# Patient Record
Sex: FEMALE | Race: WHITE | Employment: OTHER | URBAN - METROPOLITAN AREA
[De-identification: names, ages, dates, MRNs, and addresses within clinical notes are randomized per-mention and may not be internally consistent; named-entity substitution may affect disease eponyms.]

---

## 2021-12-08 ENCOUNTER — HOSPITAL ENCOUNTER (OUTPATIENT)
Age: 71
Setting detail: SPECIMEN
Discharge: HOME | End: 2021-12-08

## 2021-12-09 ENCOUNTER — HOSPITAL ENCOUNTER (OUTPATIENT)
Dept: ULTRASOUND IMAGING | Age: 71
Discharge: HOME | End: 2021-12-09
Payer: MEDICARE

## 2021-12-09 ENCOUNTER — HOSPITAL ENCOUNTER (OUTPATIENT)
Dept: MAMMOGRAPHY | Age: 71
Discharge: HOME | End: 2021-12-09
Payer: MEDICARE

## 2021-12-09 DIAGNOSIS — R92.8 ABNORMAL FINDING ON MAMMOGRAPHY: ICD-10-CM

## 2021-12-09 DIAGNOSIS — N63.0 BREAST MASS: ICD-10-CM

## 2021-12-09 DIAGNOSIS — Z98.890 STATUS POST LEFT BREAST BIOPSY: ICD-10-CM

## 2021-12-09 PROCEDURE — 19285 PERQ DEV BREAST 1ST US IMAG: CPT

## 2021-12-09 PROCEDURE — 2709999900 US PLACE BREAST LOC DEVICE 1ST LESION LEFT

## 2021-12-09 PROCEDURE — G0279 TOMOSYNTHESIS, MAMMO: HCPCS

## 2021-12-09 PROCEDURE — 77065 DX MAMMO INCL CAD UNI: CPT

## 2021-12-09 NOTE — PROGRESS NOTES
arrival for surgery. NOTE: ALL Gastric, Bariatric and Bowel surgery patients MUST follow their surgeon's instructions. 10. No gum, candy, mints, or ice chips day of procedure. 11. Please refrain from drinking alcohol the day before or day of your procedure. 12. Please do not smoke the day of your procedure. 13. Dress in loose, comfortable clothing appropriate for redressing after your procedure. Do not wear jewelry (including body piercings), make-up, fingernail polish, lotion, powders or metal hairclips. 15. Contacts will need to be removed prior to surgery. You may want to bring your eye glasses to wear immediately before and after surgery. 14. Dentures will need to be removed before your procedure. 13. Bring cases for your glasses, contacts, dentures, or hearing aids to protect them while you are in surgery. 16. If you use a CPAP, please bring it with you on the day of your procedure. 17. Do not shave or wax for 72 hours prior to procedure near your operative site  18. FOR WOMAN OF CHILDBEARING AGE ONLY- please make sure we can collect a urine sample on arrival.     If you have further questions, you may contact your surgeon's office or us at [phone]     Left instructions on patient's voicemail.     Kyler Larson RN.12/9/2021 .1:09 PM

## 2021-12-10 NOTE — PROGRESS NOTES
Place patient label inside box (if no patient label, complete below)  Name:  :  MR#:   Danielle Colder / PROCEDURE  1. I (we), [facility] (Patient Name) authorize Toy Godoy MD (Provider / Alessandro Ibarra) and/or such assistants as may be selected by him/her, to perform the following operation/procedure(s): LEFT BREAST LUMPECTOMY, LEFT BREAST SENTINEL NODE BIOPSY (2:00)PM. PLACEMENT OF BIOZORB MARKER, ULTRASOUND LATERALITY, LEFT BREAST 2 RADIOFREQUENCY TAGS, 1:00 5CM FTN AND 2:00 7CM FTN, TARGET MASS X 2 (2021) 10:30AM        Note: If unable to obtain consent prior to an emergent procedure, document the emergent reason in the medical record. This procedure has been explained to my (our) satisfaction and included in the explanation was:  A) The intended benefit, nature, and extent of the procedure to be performed;  B) The significant risks involved and the probability of success;  C) Alternative procedures and methods of treatment;  D) The dangers and probable consequences of such alternatives (including no procedure or treatment); E) The expected consequences of the procedure on my future health;  F) Whether other qualified individuals would be performing important surgical tasks and/or whether  would be present to advise or support the procedure. I (we) understand that there are other risks of infection and other serious complications in the pre-operative/procedural and postoperative/procedural stages of my (our) care. I (we) have asked all of the questions which I (we) thought were important in deciding whether or not to undergo treatment or diagnosis. These questions have been answered to my (our) satisfaction. I (we) understand that no assurance can be given that the procedure will be a success, and no guarantee or warranty of success has been given to me (us).     2. It has been explained to me (us) that during the course of the operation/procedure, unforeseen conditions may be revealed that necessitate extension of the original procedure(s) or different procedure(s) than those set forth in Paragraph 1. I (we) authorize and request that the above-named physician, his/her assistants or his/her designees, perform procedures as necessary and desirable if deemed to be in my (our) best interest.     Revised 8/2/2021                                                                          Page 1 of 2                   3. I acknowledge that health care personnel may be observing this procedure for the purpose of medical education or other specified purposes as may be necessary as requested and/or approved by my (our) physician. 4. I (we) consent to the disposal by the hospital Pathologist of the removed tissue, parts or organs in accordance with hospital policy. 5. I do ____ do not ____ consent to the use of a local infiltration pain blocking agent that will be used by my provider/surgical provider to help alleviate pain during my procedure. 6. I do ____ do not ____ consent to an emergent blood transfusion in the case of a life-threatening situation that requires blood components to be administered. This consent is valid for 24 hours from the beginning of the procedure. 7. This patient does ____ or does not ____ currently have a DNR status/order. If DNR order is in place, obtain Addendum to the Surgical Consent for ALL Patients with a DNR Order to address zion-operative status for limited intervention or DNR suspension.      8. I have read and fully understand the above Consent for Operation/Procedure and that all blanks were completed before I signed the consent.   _____________________________       _____________________      ____/____am/pm  Signature of Patient or legal representative      Printed Name / Relationship            Date / Time   ____________________________       _____________________ ____/____am/pm  Witness to Signature                                    Printed Name                    Date / Time     If patient is unable to sign or is a minor, complete the following)  Patient is a minor, ____ years of age, or unable to sign because:   ______________________________________________________________________________________________    [employer] If a phone consent is obtained, consent will be documented by using two health care professionals, each affirming that the consenting party has no questions and gives consent for the procedure discussed with the physician/provider.   _____________________          ____________________       _____/_____am/pm   2nd witness to phone consent        Printed name           Date / Time    Informed Consent:  I have provided the explanation described above in section 1 to the patient and/or legal representative.  I have provided the patient and/or legal representative with an opportunity to ask any questions about the proposed operation/procedure.   ___________________________          ____________________         ____/____am/pm  Provider / Proceduralist                            Printed name            Date / Time  Revised 8/2/2021                                                                      Page 2 of 2

## 2021-12-11 PROBLEM — Z17.0 MALIGNANT NEOPLASM OF UPPER-OUTER QUADRANT OF LEFT BREAST IN FEMALE, ESTROGEN RECEPTOR POSITIVE (HCC): Status: ACTIVE | Noted: 2021-12-11

## 2021-12-11 PROBLEM — C50.412 MALIGNANT NEOPLASM OF UPPER-OUTER QUADRANT OF LEFT BREAST IN FEMALE, ESTROGEN RECEPTOR POSITIVE (HCC): Status: ACTIVE | Noted: 2021-12-11

## 2021-12-13 ENCOUNTER — ANESTHESIA EVENT (OUTPATIENT)
Dept: OPERATING ROOM | Age: 71
End: 2021-12-13
Payer: MEDICARE

## 2021-12-14 ENCOUNTER — ANESTHESIA (OUTPATIENT)
Dept: OPERATING ROOM | Age: 71
End: 2021-12-14
Payer: MEDICARE

## 2021-12-14 ENCOUNTER — HOSPITAL ENCOUNTER (OUTPATIENT)
Age: 71
Setting detail: OUTPATIENT SURGERY
Discharge: HOME | End: 2021-12-14
Attending: SURGERY | Admitting: SURGERY
Payer: MEDICARE

## 2021-12-14 ENCOUNTER — HOSPITAL ENCOUNTER (OUTPATIENT)
Dept: NUCLEAR MEDICINE | Age: 71
Discharge: HOME | End: 2021-12-14
Payer: MEDICARE

## 2021-12-14 ENCOUNTER — HOSPITAL ENCOUNTER (OUTPATIENT)
Dept: MAMMOGRAPHY | Age: 71
Discharge: HOME | End: 2021-12-14
Payer: MEDICARE

## 2021-12-14 VITALS
TEMPERATURE: 98.2 F | OXYGEN SATURATION: 100 % | DIASTOLIC BLOOD PRESSURE: 73 MMHG | BODY MASS INDEX: 24.8 KG/M2 | SYSTOLIC BLOOD PRESSURE: 113 MMHG | HEIGHT: 63 IN | RESPIRATION RATE: 16 BRPM | HEART RATE: 69 BPM | WEIGHT: 140 LBS

## 2021-12-14 VITALS — SYSTOLIC BLOOD PRESSURE: 165 MMHG | DIASTOLIC BLOOD PRESSURE: 82 MMHG | TEMPERATURE: 98.2 F | OXYGEN SATURATION: 97 %

## 2021-12-14 DIAGNOSIS — C50.412 MALIGNANT NEOPLASM OF UPPER-OUTER QUADRANT OF LEFT BREAST IN FEMALE, ESTROGEN RECEPTOR POSITIVE (HCC): Primary | ICD-10-CM

## 2021-12-14 DIAGNOSIS — R92.0 ABNORMAL FINDING ON MAMMOGRAPHY, MICROCALCIFICATION: ICD-10-CM

## 2021-12-14 DIAGNOSIS — C50.412 MALIGNANT NEOPLASM OF UPPER-OUTER QUADRANT OF LEFT FEMALE BREAST, UNSPECIFIED ESTROGEN RECEPTOR STATUS (HCC): ICD-10-CM

## 2021-12-14 DIAGNOSIS — C50.419 MALIGNANT NEOPLASM OF UPPER-OUTER QUADRANT OF FEMALE BREAST, UNSPECIFIED ESTROGEN RECEPTOR STATUS, UNSPECIFIED LATERALITY (HCC): ICD-10-CM

## 2021-12-14 DIAGNOSIS — Z17.0 MALIGNANT NEOPLASM OF UPPER-OUTER QUADRANT OF LEFT BREAST IN FEMALE, ESTROGEN RECEPTOR POSITIVE (HCC): Primary | ICD-10-CM

## 2021-12-14 PROCEDURE — 2580000003 HC RX 258: Performed by: NURSE ANESTHETIST, CERTIFIED REGISTERED

## 2021-12-14 PROCEDURE — 7100000010 HC PHASE II RECOVERY - FIRST 15 MIN: Performed by: SURGERY

## 2021-12-14 PROCEDURE — 2500000003 HC RX 250 WO HCPCS: Performed by: SURGERY

## 2021-12-14 PROCEDURE — 6360000002 HC RX W HCPCS: Performed by: SURGERY

## 2021-12-14 PROCEDURE — 7100000011 HC PHASE II RECOVERY - ADDTL 15 MIN: Performed by: SURGERY

## 2021-12-14 PROCEDURE — 3700000001 HC ADD 15 MINUTES (ANESTHESIA): Performed by: SURGERY

## 2021-12-14 PROCEDURE — 2500000003 HC RX 250 WO HCPCS: Performed by: NURSE ANESTHETIST, CERTIFIED REGISTERED

## 2021-12-14 PROCEDURE — 88341 IMHCHEM/IMCYTCHM EA ADD ANTB: CPT

## 2021-12-14 PROCEDURE — 6360000002 HC RX W HCPCS: Performed by: NURSE ANESTHETIST, CERTIFIED REGISTERED

## 2021-12-14 PROCEDURE — 2580000003 HC RX 258: Performed by: ANESTHESIOLOGY

## 2021-12-14 PROCEDURE — 3430000000 HC RX DIAGNOSTIC RADIOPHARMACEUTICAL: Performed by: SURGERY

## 2021-12-14 PROCEDURE — 3600000014 HC SURGERY LEVEL 4 ADDTL 15MIN: Performed by: SURGERY

## 2021-12-14 PROCEDURE — 88307 TISSUE EXAM BY PATHOLOGIST: CPT

## 2021-12-14 PROCEDURE — 88342 IMHCHEM/IMCYTCHM 1ST ANTB: CPT

## 2021-12-14 PROCEDURE — 88305 TISSUE EXAM BY PATHOLOGIST: CPT

## 2021-12-14 PROCEDURE — 2580000003 HC RX 258: Performed by: SURGERY

## 2021-12-14 PROCEDURE — 78195 LYMPH SYSTEM IMAGING: CPT

## 2021-12-14 PROCEDURE — 7100000001 HC PACU RECOVERY - ADDTL 15 MIN: Performed by: SURGERY

## 2021-12-14 PROCEDURE — 7100000000 HC PACU RECOVERY - FIRST 15 MIN: Performed by: SURGERY

## 2021-12-14 PROCEDURE — 88331 PATH CONSLTJ SURG 1 BLK 1SPC: CPT

## 2021-12-14 PROCEDURE — 2709999900 HC NON-CHARGEABLE SUPPLY: Performed by: SURGERY

## 2021-12-14 PROCEDURE — 6360000002 HC RX W HCPCS: Performed by: ANESTHESIOLOGY

## 2021-12-14 PROCEDURE — A9520 TC99 TILMANOCEPT DIAG 0.5MCI: HCPCS | Performed by: SURGERY

## 2021-12-14 PROCEDURE — 3600000004 HC SURGERY LEVEL 4 BASE: Performed by: SURGERY

## 2021-12-14 PROCEDURE — 76098 X-RAY EXAM SURGICAL SPECIMEN: CPT

## 2021-12-14 PROCEDURE — 3700000000 HC ANESTHESIA ATTENDED CARE: Performed by: SURGERY

## 2021-12-14 PROCEDURE — 2720000010 HC SURG SUPPLY STERILE: Performed by: SURGERY

## 2021-12-14 RX ORDER — OXYCODONE HYDROCHLORIDE AND ACETAMINOPHEN 5; 325 MG/1; MG/1
1 TABLET ORAL PRN
Status: DISCONTINUED | OUTPATIENT
Start: 2021-12-14 | End: 2021-12-14 | Stop reason: HOSPADM

## 2021-12-14 RX ORDER — FENTANYL CITRATE 50 UG/ML
50 INJECTION, SOLUTION INTRAMUSCULAR; INTRAVENOUS EVERY 5 MIN PRN
Status: DISCONTINUED | OUTPATIENT
Start: 2021-12-14 | End: 2021-12-14 | Stop reason: HOSPADM

## 2021-12-14 RX ORDER — HYDROCODONE BITARTRATE AND ACETAMINOPHEN 5; 325 MG/1; MG/1
1 TABLET ORAL EVERY 6 HOURS PRN
Qty: 20 TABLET | Refills: 0 | Status: SHIPPED | OUTPATIENT
Start: 2021-12-14 | End: 2021-12-19

## 2021-12-14 RX ORDER — ATOMOXETINE 40 MG/1
40 CAPSULE ORAL DAILY
COMMUNITY

## 2021-12-14 RX ORDER — BUPIVACAINE HYDROCHLORIDE 5 MG/ML
INJECTION, SOLUTION EPIDURAL; INTRACAUDAL PRN
Status: DISCONTINUED | OUTPATIENT
Start: 2021-12-14 | End: 2021-12-14 | Stop reason: ALTCHOICE

## 2021-12-14 RX ORDER — ROCURONIUM BROMIDE 10 MG/ML
INJECTION, SOLUTION INTRAVENOUS PRN
Status: DISCONTINUED | OUTPATIENT
Start: 2021-12-14 | End: 2021-12-14 | Stop reason: SDUPTHER

## 2021-12-14 RX ORDER — SODIUM CHLORIDE 9 MG/ML
25 INJECTION, SOLUTION INTRAVENOUS PRN
Status: DISCONTINUED | OUTPATIENT
Start: 2021-12-14 | End: 2021-12-14 | Stop reason: HOSPADM

## 2021-12-14 RX ORDER — ISOSULFAN BLUE 50 MG/5ML
INJECTION, SOLUTION SUBCUTANEOUS PRN
Status: DISCONTINUED | OUTPATIENT
Start: 2021-12-14 | End: 2021-12-14 | Stop reason: ALTCHOICE

## 2021-12-14 RX ORDER — LIDOCAINE HYDROCHLORIDE 10 MG/ML
1 INJECTION, SOLUTION EPIDURAL; INFILTRATION; INTRACAUDAL; PERINEURAL
Status: DISCONTINUED | OUTPATIENT
Start: 2021-12-14 | End: 2021-12-14 | Stop reason: HOSPADM

## 2021-12-14 RX ORDER — MAGNESIUM HYDROXIDE 1200 MG/15ML
LIQUID ORAL CONTINUOUS PRN
Status: COMPLETED | OUTPATIENT
Start: 2021-12-14 | End: 2021-12-14

## 2021-12-14 RX ORDER — SODIUM CHLORIDE 0.9 % (FLUSH) 0.9 %
5-40 SYRINGE (ML) INJECTION EVERY 12 HOURS SCHEDULED
Status: DISCONTINUED | OUTPATIENT
Start: 2021-12-14 | End: 2021-12-14 | Stop reason: HOSPADM

## 2021-12-14 RX ORDER — ACETAMINOPHEN 160 MG
TABLET,DISINTEGRATING ORAL DAILY
COMMUNITY

## 2021-12-14 RX ORDER — SODIUM CHLORIDE, SODIUM LACTATE, POTASSIUM CHLORIDE, CALCIUM CHLORIDE 600; 310; 30; 20 MG/100ML; MG/100ML; MG/100ML; MG/100ML
INJECTION, SOLUTION INTRAVENOUS CONTINUOUS
Status: DISCONTINUED | OUTPATIENT
Start: 2021-12-14 | End: 2021-12-14 | Stop reason: HOSPADM

## 2021-12-14 RX ORDER — MEPERIDINE HYDROCHLORIDE 25 MG/ML
12.5 INJECTION INTRAMUSCULAR; INTRAVENOUS; SUBCUTANEOUS EVERY 5 MIN PRN
Status: DISCONTINUED | OUTPATIENT
Start: 2021-12-14 | End: 2021-12-14 | Stop reason: HOSPADM

## 2021-12-14 RX ORDER — FENTANYL CITRATE 50 UG/ML
25 INJECTION, SOLUTION INTRAMUSCULAR; INTRAVENOUS EVERY 5 MIN PRN
Status: DISCONTINUED | OUTPATIENT
Start: 2021-12-14 | End: 2021-12-14 | Stop reason: HOSPADM

## 2021-12-14 RX ORDER — SODIUM CHLORIDE, SODIUM LACTATE, POTASSIUM CHLORIDE, CALCIUM CHLORIDE 600; 310; 30; 20 MG/100ML; MG/100ML; MG/100ML; MG/100ML
INJECTION, SOLUTION INTRAVENOUS CONTINUOUS PRN
Status: DISCONTINUED | OUTPATIENT
Start: 2021-12-14 | End: 2021-12-14 | Stop reason: SDUPTHER

## 2021-12-14 RX ORDER — DIPHENHYDRAMINE HYDROCHLORIDE 50 MG/ML
12.5 INJECTION INTRAMUSCULAR; INTRAVENOUS
Status: DISCONTINUED | OUTPATIENT
Start: 2021-12-14 | End: 2021-12-14 | Stop reason: HOSPADM

## 2021-12-14 RX ORDER — LABETALOL HYDROCHLORIDE 5 MG/ML
5 INJECTION, SOLUTION INTRAVENOUS EVERY 10 MIN PRN
Status: DISCONTINUED | OUTPATIENT
Start: 2021-12-14 | End: 2021-12-14 | Stop reason: HOSPADM

## 2021-12-14 RX ORDER — GLYCOPYRROLATE 1 MG/5 ML
SYRINGE (ML) INTRAVENOUS PRN
Status: DISCONTINUED | OUTPATIENT
Start: 2021-12-14 | End: 2021-12-14 | Stop reason: SDUPTHER

## 2021-12-14 RX ORDER — GABAPENTIN 100 MG/1
200 CAPSULE ORAL NIGHTLY
COMMUNITY

## 2021-12-14 RX ORDER — ONDANSETRON 2 MG/ML
INJECTION INTRAMUSCULAR; INTRAVENOUS PRN
Status: DISCONTINUED | OUTPATIENT
Start: 2021-12-14 | End: 2021-12-14 | Stop reason: SDUPTHER

## 2021-12-14 RX ORDER — PROCHLORPERAZINE EDISYLATE 5 MG/ML
5 INJECTION INTRAMUSCULAR; INTRAVENOUS
Status: DISCONTINUED | OUTPATIENT
Start: 2021-12-14 | End: 2021-12-14 | Stop reason: HOSPADM

## 2021-12-14 RX ORDER — FAMOTIDINE 20 MG/1
20 TABLET, FILM COATED ORAL PRN
COMMUNITY

## 2021-12-14 RX ORDER — PROPOFOL 10 MG/ML
INJECTION, EMULSION INTRAVENOUS PRN
Status: DISCONTINUED | OUTPATIENT
Start: 2021-12-14 | End: 2021-12-14 | Stop reason: SDUPTHER

## 2021-12-14 RX ORDER — LEVOTHYROXINE SODIUM 0.05 MG/1
50 TABLET ORAL DAILY
COMMUNITY

## 2021-12-14 RX ORDER — AMLODIPINE BESYLATE 10 MG/1
10 TABLET ORAL DAILY
COMMUNITY

## 2021-12-14 RX ORDER — OXYCODONE HYDROCHLORIDE AND ACETAMINOPHEN 5; 325 MG/1; MG/1
2 TABLET ORAL PRN
Status: DISCONTINUED | OUTPATIENT
Start: 2021-12-14 | End: 2021-12-14 | Stop reason: HOSPADM

## 2021-12-14 RX ORDER — SODIUM CHLORIDE 0.9 % (FLUSH) 0.9 %
5-40 SYRINGE (ML) INJECTION PRN
Status: DISCONTINUED | OUTPATIENT
Start: 2021-12-14 | End: 2021-12-14 | Stop reason: HOSPADM

## 2021-12-14 RX ORDER — FENTANYL CITRATE 50 UG/ML
INJECTION, SOLUTION INTRAMUSCULAR; INTRAVENOUS PRN
Status: DISCONTINUED | OUTPATIENT
Start: 2021-12-14 | End: 2021-12-14 | Stop reason: SDUPTHER

## 2021-12-14 RX ORDER — HYDRALAZINE HYDROCHLORIDE 20 MG/ML
5 INJECTION INTRAMUSCULAR; INTRAVENOUS EVERY 10 MIN PRN
Status: DISCONTINUED | OUTPATIENT
Start: 2021-12-14 | End: 2021-12-14 | Stop reason: HOSPADM

## 2021-12-14 RX ORDER — PHENOL 1.4 %
1 AEROSOL, SPRAY (ML) MUCOUS MEMBRANE DAILY
COMMUNITY

## 2021-12-14 RX ORDER — NEOSTIGMINE METHYLSULFATE 5 MG/5 ML
SYRINGE (ML) INTRAVENOUS PRN
Status: DISCONTINUED | OUTPATIENT
Start: 2021-12-14 | End: 2021-12-14 | Stop reason: SDUPTHER

## 2021-12-14 RX ORDER — ONDANSETRON 2 MG/ML
4 INJECTION INTRAMUSCULAR; INTRAVENOUS
Status: DISCONTINUED | OUTPATIENT
Start: 2021-12-14 | End: 2021-12-14 | Stop reason: HOSPADM

## 2021-12-14 RX ADMIN — PHENYLEPHRINE HYDROCHLORIDE 100 MCG: 10 INJECTION, SOLUTION INTRAMUSCULAR; INTRAVENOUS; SUBCUTANEOUS at 13:36

## 2021-12-14 RX ADMIN — PHENYLEPHRINE HYDROCHLORIDE 100 MCG: 10 INJECTION, SOLUTION INTRAMUSCULAR; INTRAVENOUS; SUBCUTANEOUS at 15:15

## 2021-12-14 RX ADMIN — HYDROMORPHONE HYDROCHLORIDE 0.25 MG: 1 INJECTION, SOLUTION INTRAMUSCULAR; INTRAVENOUS; SUBCUTANEOUS at 16:30

## 2021-12-14 RX ADMIN — Medication 50 MG: at 14:46

## 2021-12-14 RX ADMIN — CEFAZOLIN 2000 MG: 10 INJECTION, POWDER, FOR SOLUTION INTRAVENOUS at 13:35

## 2021-12-14 RX ADMIN — PHENYLEPHRINE HYDROCHLORIDE 100 MCG: 10 INJECTION, SOLUTION INTRAMUSCULAR; INTRAVENOUS; SUBCUTANEOUS at 14:21

## 2021-12-14 RX ADMIN — Medication 3.5 MG: at 14:49

## 2021-12-14 RX ADMIN — HYDROMORPHONE HYDROCHLORIDE 0.25 MG: 1 INJECTION, SOLUTION INTRAMUSCULAR; INTRAVENOUS; SUBCUTANEOUS at 16:07

## 2021-12-14 RX ADMIN — FENTANYL CITRATE 50 MCG: 50 INJECTION, SOLUTION INTRAMUSCULAR; INTRAVENOUS at 13:57

## 2021-12-14 RX ADMIN — PROPOFOL 150 MG: 10 INJECTION, EMULSION INTRAVENOUS at 13:30

## 2021-12-14 RX ADMIN — TILMANOCEPT 0.82 MILLICURIE: KIT at 13:44

## 2021-12-14 RX ADMIN — PROPOFOL 20 MG: 10 INJECTION, EMULSION INTRAVENOUS at 13:32

## 2021-12-14 RX ADMIN — SODIUM CHLORIDE, POTASSIUM CHLORIDE, SODIUM LACTATE AND CALCIUM CHLORIDE: 600; 310; 30; 20 INJECTION, SOLUTION INTRAVENOUS at 13:13

## 2021-12-14 RX ADMIN — HYDROMORPHONE HYDROCHLORIDE 0.25 MG: 1 INJECTION, SOLUTION INTRAMUSCULAR; INTRAVENOUS; SUBCUTANEOUS at 15:48

## 2021-12-14 RX ADMIN — ROCURONIUM BROMIDE 50 MG: 10 INJECTION, SOLUTION INTRAVENOUS at 13:31

## 2021-12-14 RX ADMIN — ONDANSETRON 4 MG: 2 INJECTION INTRAMUSCULAR; INTRAVENOUS at 14:46

## 2021-12-14 RX ADMIN — Medication 0.6 MG: at 14:49

## 2021-12-14 RX ADMIN — PHENYLEPHRINE HYDROCHLORIDE 100 MCG: 10 INJECTION, SOLUTION INTRAMUSCULAR; INTRAVENOUS; SUBCUTANEOUS at 15:03

## 2021-12-14 RX ADMIN — SODIUM CHLORIDE, SODIUM LACTATE, POTASSIUM CHLORIDE, AND CALCIUM CHLORIDE: .6; .31; .03; .02 INJECTION, SOLUTION INTRAVENOUS at 13:01

## 2021-12-14 RX ADMIN — Medication 50 MG: at 13:30

## 2021-12-14 RX ADMIN — FENTANYL CITRATE 50 MCG: 50 INJECTION, SOLUTION INTRAMUSCULAR; INTRAVENOUS at 13:30

## 2021-12-14 RX ADMIN — PHENYLEPHRINE HYDROCHLORIDE 200 MCG: 10 INJECTION, SOLUTION INTRAMUSCULAR; INTRAVENOUS; SUBCUTANEOUS at 13:40

## 2021-12-14 ASSESSMENT — PULMONARY FUNCTION TESTS
PIF_VALUE: 16
PIF_VALUE: 17
PIF_VALUE: 16
PIF_VALUE: 17
PIF_VALUE: 16
PIF_VALUE: 15
PIF_VALUE: 16
PIF_VALUE: 16
PIF_VALUE: 17
PIF_VALUE: 16
PIF_VALUE: 18
PIF_VALUE: 17
PIF_VALUE: 16
PIF_VALUE: 17
PIF_VALUE: 8
PIF_VALUE: 17
PIF_VALUE: 19
PIF_VALUE: 17
PIF_VALUE: 16
PIF_VALUE: 17
PIF_VALUE: 16
PIF_VALUE: 17
PIF_VALUE: 17
PIF_VALUE: 8
PIF_VALUE: 17
PIF_VALUE: 15
PIF_VALUE: 17
PIF_VALUE: 17
PIF_VALUE: 15
PIF_VALUE: 17
PIF_VALUE: 16
PIF_VALUE: 17
PIF_VALUE: 6
PIF_VALUE: 17
PIF_VALUE: 16
PIF_VALUE: 17
PIF_VALUE: 17
PIF_VALUE: 16
PIF_VALUE: 17
PIF_VALUE: 16
PIF_VALUE: 1
PIF_VALUE: 17
PIF_VALUE: 1
PIF_VALUE: 17
PIF_VALUE: 16
PIF_VALUE: 17
PIF_VALUE: 17
PIF_VALUE: 16
PIF_VALUE: 17
PIF_VALUE: 1
PIF_VALUE: 17
PIF_VALUE: 17
PIF_VALUE: 1
PIF_VALUE: 17
PIF_VALUE: 16
PIF_VALUE: 17
PIF_VALUE: 16
PIF_VALUE: 17
PIF_VALUE: 23
PIF_VALUE: 15
PIF_VALUE: 17
PIF_VALUE: 17
PIF_VALUE: 16
PIF_VALUE: 5
PIF_VALUE: 17
PIF_VALUE: 16
PIF_VALUE: 18
PIF_VALUE: 17
PIF_VALUE: 17
PIF_VALUE: 16
PIF_VALUE: 16
PIF_VALUE: 17
PIF_VALUE: 17
PIF_VALUE: 15
PIF_VALUE: 13
PIF_VALUE: 16
PIF_VALUE: 16
PIF_VALUE: 15
PIF_VALUE: 16
PIF_VALUE: 17
PIF_VALUE: 17
PIF_VALUE: 16
PIF_VALUE: 16
PIF_VALUE: 17
PIF_VALUE: 17
PIF_VALUE: 16
PIF_VALUE: 17
PIF_VALUE: 20
PIF_VALUE: 17
PIF_VALUE: 19
PIF_VALUE: 20

## 2021-12-14 ASSESSMENT — ENCOUNTER SYMPTOMS: SHORTNESS OF BREATH: 0

## 2021-12-14 ASSESSMENT — PAIN DESCRIPTION - DESCRIPTORS
DESCRIPTORS: BURNING
DESCRIPTORS: ACHING;SORE

## 2021-12-14 ASSESSMENT — PAIN DESCRIPTION - LOCATION
LOCATION: BREAST
LOCATION: BREAST;ARM

## 2021-12-14 ASSESSMENT — PAIN SCALES - GENERAL
PAINLEVEL_OUTOF10: 6
PAINLEVEL_OUTOF10: 2
PAINLEVEL_OUTOF10: 5
PAINLEVEL_OUTOF10: 3
PAINLEVEL_OUTOF10: 5

## 2021-12-14 ASSESSMENT — PAIN DESCRIPTION - PAIN TYPE
TYPE: ACUTE PAIN;SURGICAL PAIN
TYPE: ACUTE PAIN;SURGICAL PAIN

## 2021-12-14 ASSESSMENT — PAIN DESCRIPTION - FREQUENCY
FREQUENCY: CONTINUOUS
FREQUENCY: CONTINUOUS

## 2021-12-14 ASSESSMENT — PAIN - FUNCTIONAL ASSESSMENT: PAIN_FUNCTIONAL_ASSESSMENT: 0-10

## 2021-12-14 ASSESSMENT — PAIN DESCRIPTION - ONSET: ONSET: ON-GOING

## 2021-12-14 ASSESSMENT — LIFESTYLE VARIABLES: SMOKING_STATUS: 0

## 2021-12-14 ASSESSMENT — PAIN DESCRIPTION - ORIENTATION
ORIENTATION: LEFT;LOWER
ORIENTATION: LEFT

## 2021-12-14 ASSESSMENT — PAIN DESCRIPTION - PROGRESSION: CLINICAL_PROGRESSION: GRADUALLY IMPROVING

## 2021-12-14 NOTE — PROGRESS NOTES
Ambulatory Surgery/Procedure Discharge Note    Vitals:    12/14/21 1719   BP: 113/73   Pulse: 69   Resp: 16   Temp: 98.2 °F (36.8 °C)   SpO2: 100%       In: 850 [I.V.:850]  Out: 80 [Drains:30] -- pt drank some water, ate some saltines, and voided X 1 in toilet    Restroom use offered before discharge. Yes -- voided X 1 in toilet    Pain assessment:  present - adequately treated and location, left axilla/breast area  Pain Level: 2    Pt returned to [facility] from PACU s/p left breast surgery. Pt fully alert; speech clear; breathing easily on RA; has two incisions left breast/axilla area closed with surgical glue, edges well approximated and all clean, dry and intact. Pt has NAYAN drain at area with dark red fluid, and 30 ml drained from same. Pt tolerated water and saltines without problem. Discharge instructions given to pt and pt's  at bedside to include NAYAN drain instructions. RX for Richardson Jett has been escribed to pt's pharmacy, and pt and  aware. Supplies for NAYAN drain provided for home use, along with pamphlet for recording output. IV removed, and dressing applied. Pt voided X 1 in toilet prior to discharge. Patient discharged to home/self care.  Patient discharged via wheel chair by PCA to waiting family/S.O.       12/14/2021 6:45 PM

## 2021-12-14 NOTE — BRIEF OP NOTE
Brief Postoperative Note      Patient: Sameera Munguia  YOB: 1950  MRN: [id number]    Date of Procedure: 12/14/2021    Pre-Op Diagnosis: Malignant neoplasm of upper-outer quadrant of female breast, unspecified estrogen receptor status, unspecified laterality ([facility] [address] [C50.419]    Post-Op Diagnosis: Same       Procedure(s):  LEFT BREAST LUMPECTOMY, LEFT BREAST SENTINEL NODE BIOPSY (2:00)PM, COMPLETION AXILLARY DISSECTION, ULTRASOUND LATERALITY, LEFT BREAST 2 RADIOFREQUENCY TAGS, 1:00 5CM FTN AND 2:00 7CM FTN, TARGET MASS X 2 (12/9/2021) 10:30AM  .    Surgeon(s):  Grace Bergeron MD    Assistant:  Surgical Assistant: John Gayle    Anesthesia: General    Estimated Blood Loss (mL): less than 951     Complications: None    Specimens:   ID Type Source Tests Collected by Time Destination   A : A. SENTINEL NODE #1 LEFT AXILLA  (COUNT: 8980) Tissue Tissue SURGICAL PATHOLOGY Grace Bergeron MD 12/14/2021 1349    B : B. LEFT AXILLARY TISSUE  Tissue Tissue SURGICAL PATHOLOGY Grace Bergeron MD 12/14/2021 1402    C : [address] #2 LEFT AXILLA (NOT BLUE, LEVEL 1, COUNT: 1778, BACKGROUND: 122, FROZEN) Tissue Tissue SURGICAL PATHOLOGY Grace Bergeron MD 12/14/2021 1417    D : D. LEFT AXILLARY CONTENTS Tissue Tissue SURGICAL PATHOLOGY Grace Bergeron MD 12/14/2021 1431    E : E. LEFT BREAST MASS  Tissue Tissue SURGICAL PATHOLOGY Grace Bergeron MD 12/14/2021 1510        Implants:  * No implants in log *      Drains:   Closed/Suction Drain Inferior;  Lateral; Left Breast Bulb 15 [place] (Active)       Findings: two clips/biopsy marker in surgical specimen, sentinel nodes x 2 - first positive for malignancy    Electronically signed by Grace Bergeron MD on 12/14/2021 at 3:26 PM

## 2021-12-14 NOTE — ANESTHESIA PRE PROCEDURE
Department of Anesthesiology  Preprocedure Note       Name:  Darnell Singh   Age:  70 y.o.  :  1950                                          MRN:  [id number]         Date:  2021      Surgeon: Winston Gutierrez):  Shira Engel MD    Procedure: Procedure(s):  LEFT BREAST LUMPECTOMY, LEFT BREAST SENTINEL NODE BIOPSY (2:00)PM. PLACEMENT OF BIOZORB MARKER, ULTRASOUND LATERALITY, LEFT BREAST 2 RADIOFREQUENCY TAGS, 1:00 5CM FTN AND 2:00 7CM FTN, TARGET MASS X 2 (2021) 10:30AM  .    Medications prior to admission:   Prior to Admission medications    Medication Sig Start Date End Date Taking? Authorizing Provider   Venlafaxine HCl (EFFEXOR XR PO) Take 187.5 mg by mouth daily   Yes Historical Provider, MD   atomoxetine (STRATTERA) 40 MG capsule Take 40 mg by mouth daily   Yes Historical Provider, MD   amLODIPine (NORVASC) 10 MG tablet Take 10 mg by mouth daily   Yes Historical Provider, MD   levothyroxine (SYNTHROID) 50 MCG tablet Take 50 mcg by mouth Daily   Yes Historical Provider, MD   gabapentin (NEURONTIN) 100 MG capsule Take 100 mg by mouth nightly.  2 tabs   Yes Historical Provider, MD   Cholecalciferol (VITAMIN D3) 50 MCG (2000 UT) CAPS Take by mouth daily   Yes Historical Provider, MD   calcium carbonate 600 MG TABS tablet Take 1 tablet by mouth daily   Yes Historical Provider, MD   famotidine (PEPCID) 20 MG tablet Take 20 mg by mouth as needed   Yes Historical Provider, MD   ZOCOR 20 MG tablet TAKE ONE TABLET BY MOUTH EVERY NIGHT AT BEDTIME 12/15/11  Yes Man Vasques MD       Current medications:    Current Facility-Administered Medications   Medication Dose Route Frequency Provider Last Rate Last Admin    ceFAZolin (ANCEF) 2000 mg in dextrose 5 % 50 mL IVPB  2,000 mg IntraVENous Once Shira Engel MD        lactated ringers infusion   IntraVENous Continuous Alvaro Martinez MD        sodium chloride flush 0.9 % injection 5-40 mL  5-40 mL IntraVENous 2 times per day Jessica Longoria Grant Sarkar MD        sodium chloride flush 0.9 % injection 5-40 mL  5-40 mL IntraVENous PRN Alvaro Martinez MD        0.9 % sodium chloride infusion  25 mL IntraVENous PRN Alvaro Martinez MD        lidocaine PF 1 % injection 1 mL  1 mL IntraDERmal Once PRN Alvaro Martinez MD           Allergies:  No Known Allergies    Problem List:    Patient Active Problem List   Diagnosis Code    Malignant neoplasm of upper-outer quadrant of left breast in female, estrogen receptor positive ([facility] C50.412, Z17.0       Past Medical History:        Diagnosis Date    Breast cancer ([facility] 2004    right       Past Surgical History:        Procedure Laterality Date    US GUIDED NEEDLE LOC OF LEFT BREAST Left 12/9/2021    US GUIDED NEEDLE LOC OF LEFT BREAST 12/9/2021 [facility] ULTRASOUND    US GUIDED NEEDLE LOC OF LEFT BREAST Left 12/9/2021    US GUIDED NEEDLE LOC OF LEFT BREAST 12/9/2021 [facility] ULTRASOUND       Social History:    Social History     Tobacco Use    Smoking status: Not on file    Smokeless tobacco: Not on file   Substance Use Topics    Alcohol use: Not on file                                Counseling given: Not Answered      Vital Signs (Current):   Vitals:    12/09/21 1313 12/14/21 1239   BP:  121/84   Pulse:  80   Resp:  18   Temp:  97.9 °F (36.6 °C)   TempSrc:  Temporal   SpO2:  100%   Weight: 140 lb (63.5 kg) 140 lb (63.5 kg)   Height: 5' 3\" (1.6 m) 5' 3\" (1.6 m)                                              BP Readings from Last 3 Encounters:   12/14/21 121/84       NPO Status:                                                                                 BMI:   Wt Readings from Last 3 Encounters:   12/14/21 140 lb (63.5 kg)     Body mass index is 24.8 kg/m².     CBC: No results found for: WBC, RBC, HGB, HCT, MCV, RDW, PLT    CMP: No results found for: NA, K, CL, CO2, BUN, CREATININE, GFRAA, AGRATIO, LABGLOM, GLUCOSE, PROT, CALCIUM, BILITOT, ALKPHOS, AST, ALT    POC Tests: No results for input(s): POCGLU, Jame Silvestre, POCCL, POCBUN, POCHEMO, POCHCT in the last 72 hours. Coags: No results found for: PROTIME, INR, APTT    HCG (If Applicable): No results found for: PREGTESTUR, PREGSERUM, HCG, HCGQUANT     ABGs: No results found for: PHART, PO2ART, CKV9BRB, DIK1CID, BEART, Y5JKGRIB     Type & Screen (If Applicable):  No results found for: LABABO, LABRH    Drug/Infectious Status (If Applicable):  No results found for: HIV, HEPCAB    COVID-19 Screening (If Applicable): No results found for: COVID19        Anesthesia Evaluation    Airway: Mallampati: II  TM distance: >3 FB   Neck ROM: full  Mouth opening: > = 3 FB Dental:          Pulmonary:       (-) asthma, shortness of breath, sleep apnea and not a current smoker                           Cardiovascular:  Exercise tolerance: good (>4 METS),       (-) past MI and  angina                Neuro/Psych:      (-) seizures           GI/Hepatic/Renal:   (+) GERD:,           Endo/Other:    (+) malignancy/cancer. (-) diabetes mellitus               Abdominal:             Vascular: Other Findings:             Anesthesia Plan      general     ASA 2       Induction: intravenous. MIPS: Postoperative opioids intended. Anesthetic plan and risks discussed with patient. Plan discussed with CRNA.                 Enid Nieves MD   12/14/2021

## 2021-12-14 NOTE — H&P
The patient was identified and examined. The surgical site was marked by RFID tag localization. No interval changes in health status since H&P was performed. The patient is cleared to proceed as scheduled.

## 2021-12-15 NOTE — ANESTHESIA POSTPROCEDURE EVALUATION
Department of Anesthesiology  Postprocedure Note    Patient: Sameera Munguia  MRN: [id number]  YOB: 1950  Date of evaluation: 12/14/2021  Time:  9:29 PM     Procedure Summary     Date: 12/14/21 Room / Location: [address]    Anesthesia Start: 7843 Anesthesia Stop: 7252    Procedures:       LEFT BREAST LUMPECTOMY, LEFT BREAST SENTINEL NODE BIOPSY (2:00)PM, COMPLETION AXILLARY DISSECTION, ULTRASOUND LATERALITY, LEFT BREAST 2 RADIOFREQUENCY TAGS, 1:00 5CM FTN AND 2:00 7CM FTN, TARGET MASS X 2 (12/9/2021) 10:30AM (Left )      . (Left ) Diagnosis:       Malignant neoplasm of upper-outer quadrant of female breast, unspecified estrogen receptor status, unspecified laterality ([address]      (Malignant neoplasm of upper-outer quadrant of female breast, unspecified estrogen receptor status, unspecified laterality ([address] Kobe Cap)    Surgeons: Grace Bergeron MD Responsible Provider: Tom Lind MD    Anesthesia Type: general ASA Status: 2          Anesthesia Type: general    Shahnaz Phase I: Shahnaz Score: 10    Shahnaz Phase II: Shahnaz Score: 10    Last vitals: Reviewed and per EMR flowsheets.        Anesthesia Post Evaluation    Patient location during evaluation: PACU  Patient participation: complete - patient participated  Level of consciousness: awake and alert  Pain score: 2  Airway patency: patent  Nausea & Vomiting: no nausea and no vomiting  Complications: no  Cardiovascular status: hemodynamically stable  Respiratory status: acceptable  Hydration status: euvolemic

## 2021-12-24 NOTE — OP NOTE
[address]               [address] [place], [address]                                OPERATIVE REPORT    PATIENT NAME: Fede Salazar             :        1950  MED REC NO:   [id number]                          ROOM:  ACCOUNT NO:   [de-identified]                           ADMIT DATE: 2021  PROVIDER:     Ashtyn Cody MD    DATE OF PROCEDURE:  2021    PREOPERATIVE DIAGNOSIS:  Invasive lobular carcinoma, upper outer left  breast.    POSTOPERATIVE DIAGNOSES:  Invasive lobular carcinoma, upper outer left  breast; lymph node metastasis. SURGEON:  Ashtyn Cody MD    DESCRIPTION OF THE OPERATIVE PROCEDURE:  Left lumpectomy with  radiofrequency tag localization x2, left sentinel node biopsy, and  completion left axillary dissection. ASSISTANT:  Radha payan and Cayla Lopez. ANESTHESIA:  General.    BLOOD LOSS:  Less than 100 mL. INDICATIONS:  The patient is a 19-year-old woman who was found to have  two suspicious enhancing masses in the upper outer quadrant of the left  breast.  The patient underwent ultrasound core biopsy which demonstrated  lobular carcinoma in situ at the 2 o'clock position and invasive lobular  carcinoma with lobular carcinoma in situ in the 1 o'clock position. Due  to the highly suspicious nature of the mass, inclusion of the 2 o'clock  mass was recommended at the time of lumpectomy. DESCRIPTION OF THE OPERATIVE PROCEDURE: The patient was taken to the  operating room, placed in the supine position. She had previously  undergone ultrasound-guided localization of the previously biopsied  malignancy and the second lesion. She was placed in the supine  position, underwent induction of general anesthesia. Periareolar  injection of technetium and peritumoral injection of isosulfan blue was  performed and the breast massaged several minutes to promote lymphatic  uptake.   We began in the axilla by injecting local anesthetic for postop  pain control. We created a curvilinear incision over the radioactive  hotspot, extended the incision through the subcutaneous tissues and the  clavipectoral fascia. We identified a deep level I node with a count of  8980. This was submitted for frozen section. While we are waiting for  the results of the frozen section, we proceeded with the dissection. We  identified a second node which was neither blue nor radioactive, and  this was submitted in formalin as left axillary tissue. We identified a  second sentinel node also in deep level I. The node was not blue and  the count was 1778. This was submitted for frozen section. At that  point, we were notified that the first sentinel node actually contained  two nodes which were both positive for metastatic disease. Frozen  section of the second sentinel node was then deferred. We then  proceeded with dissection of the axillary contents caudal to the vein to  the latissimus dorsi and chest wall and down to the level of the  thoracodorsal nerve. That tissue was placed in formalin and designated  left axillary contents. We placed a 15 round Adan drain through a  separate stab wound into the axilla and secured it to the skin using a  2-0 silk suture. The incision was closed in two layers with interrupted  subcutaneous sutures and a 4-0 Monocryl subcuticular skin closure. The  drain was irrigated with 0.5% Marcaine for postop pain control. We then  turned our attention to the breast and injected additional local  anesthetic. We created a radial incision to include both of the sites  indicated by the radiofrequency tags. We then proceeded with dissection  of the lumpectomy specimen en bloc using electrocautery. Once we had  delivered the specimen, we oriented it using a MarginMap and submitted  it for specimen radiograph.   The radiograph confirmed the presence of  both of the radiofrequency tags and the biopsy markers fairly centrally  situated within the specimen with some residual calcifications. No  additional margins were obtained. We marked the perimeter of the cavity  with clips and then closed in two layers with interrupted deep dermal  sutures and a 4-0 Monocryl subcuticular skin closure. Both the axillary  and the lumpectomy incisions were sealed with Dermabond. A Biopatch was  placed around the drain and secured with a Tegaderm dressing. The  patient was taken to recovery in satisfactory condition having tolerated  the procedure well.         Elijah Corona MD    D: 12/24/2021 9:13:44       T: 12/24/2021 9:18:30     [email or web address]  Job#: [id number]     Doc#: [id number]    CC:

## 2022-01-12 ENCOUNTER — HOSPITAL ENCOUNTER (OUTPATIENT)
Dept: MAMMOGRAPHY | Age: 72
Discharge: HOME | End: 2022-01-12
Payer: MEDICARE

## 2022-01-12 PROCEDURE — 96040 HC GENETIC COUNSELING: CPT

## 2022-01-12 PROCEDURE — G0463 HOSPITAL OUTPT CLINIC VISIT: HCPCS

## 2022-01-12 NOTE — PROGRESS NOTES
[facility]  New Visit Clinic Note      Patient Name: Manan Quintana             YOB: 1950  MRN: [id number]  Date of Visit: 1/12/2022          Manan Quintana was referred by Dr. Judie Herrera for genetic counseling due to her personal history of bilateral breast cancer and family history of cancer. Ms. Jonna Bush was seen in the [facility] Cancer [facility] at [facility] on  1/12/2022. Negin Wiseman, Licensed Genetic Counselor, spent 30 minutes collecting and reviewing personal and family medical information, providing genetic risk assessment, genetic counseling and psychosocial assessment. Clinical History: Manan Quintana is a 70 y.o. female with a personal history of right breast invasive ductal carcinoma, diagnosed at age 47, and left breast invasive lobular carcinoma, diagnosed at age 70. She previously underwent genetic testing for BRCA1/2 sequencing and deletion/duplication analysis. Results were negative    Psychosocial concerns: no concerns reported    Family History by patient report:   · Sister, dx. 40 with non small cell lung cancer  · Sister, dx. 50 with breast cancer  · Father, dx. 52's with prostate cancer  · Mother, dx. 67 with renal cell cancer  · Maternal uncle, dx. 66's with lymphoma  · Maternal grandfather, dx. 63's with lymphoma    Ancestry: Ashkenazi Latter day    Cancer Risk Assessment and Genetic Testing: We had a detailed discussion surrounding the concepts of hereditary, familial and sporadic cancer. Regarding the hereditary forms of cancer, autosomal dominant inheritance was reviewed. We briefly discussed potential changes in screening and management guidelines that could occur, based on genetic testing results. After reviewing the medical and family histories and risk assessment, we discussed genetic testing options.  Specifically, we talked about currently available single-gene and multi-gene panel testing options, including benefits and limitations. I explained possible results of genetic testing including positive, negative or a variant of uncertain significance, and briefly discussed the implications of each. Ms. [place] (NCCN) criteria for testing based on her personal history of bilateral breast cancer and sister diagnosed at 50 with breast cancer. We discussed insurance discrimination after genetic testing. It was reviewed that there was federal legislation known as The [place] Non-Discrimination Act (OSMAN) which went into effect in 2008. OSMAN created new protections against the misuse of genetic information by health insurance companies and employers. OSMAN makes it illegal for health insurance companies to deny coverage or to charge a higher rate or premium to an otherwise healthy individual based on genetic test results or family health history. OSMAN applies to group health insurance, individual health insurance and Medicare. OSMAN does not apply to long term care insurance, disability insurance, life insurance or insurance for [employer] personnel/V. A. Beneficiaries, the [employer] and [employer] employees who receive care through the [facility]. OSMAN also makes it illegal for employers to use this information when making employment decisions such as hiring, firing, promotions, or any other terms of employment. OSMAN does not apply to employers with less than 15 employees. Ms. Eileen Moore elected to proceed with the CancerNext-Expanded gene panel from [facility]. A blood sample was collected at our appointment. Barring insurance or laboratory delay, results will be available 2-3 weeks after receipt of the sample in the laboratory. She will be notified of the result as soon as results are available.   A follow up appointment may be scheduled to discuss further screening and management guidelines, based on these results. Resources provided: Information about [facility], including billing information, and my contact information were given to Ms. Cortez. I remain available to Ms. Cortez and her healthcare providers should they have any questions or concerns. I may be reached in the [address] [place] [address] at [phone] (direct) or [phone].     Sincerely,    Dipika Taylor MS, [facility]  Licensed Genetic Counselor  [facility]    CC:  Dr. Chris Serra

## 2022-01-12 NOTE — PROGRESS NOTES
PRE-OP INSTRUCTIONS FOR THE SURGICAL PATIENT YOU ARE UNABLE TO MAKE CONTACT FOR AN INTERVIEW:    All patients having surgery or anesthesia are required to be Covid tested OR to have been vaccinated at least 14 days prior to your procedure. It is very important to return our call to [phone] and notify the staff of your last vaccination date otherwise you will be required to complete Covid PCR test within the 5-6 days prior to surgery & quarantine. The results will need to be faxed to PreAdmission Testing at [phone]. 1. Follow all instructions provided to you from your surgeons office, including your ARRIVAL TIME. 2. Enter the MAIN entrance located on [address] and report to the desk. 3. Bring your insurance & photo ID with you. You may also be asked to pay a co-pay, as you may want to bring a check or credit card with you. 4. Leave all other valuables at home. 5. Arrange for someone to drive you home and be with you for the first 24 hours after discharge. 6. Bring your medication list with you day of surgery with doses and frequency listed (including over the counter medications)  7. You must contact your surgeon for Instructions regarding:              - ALL medication instructions, especially if taking blood thinners, aspirin, or diabetic medication.         -Bariatric patients call surgeon if on diabetic medications as some need to be stopped 1 week preop  - IF  there is a change in your physical condition such as a cold, fever, rash, cuts, sores or any other infection, especially near your surgical site. 8. A Pre-op History and Physical for surgery MUST be completed by your Physician or an Urgent Care within 30 days of your procedure date. Please bring a copy with you on the day of your procedure and along with any other testing performed. 9. DO NOT EAT ANYTHING eight hours prior to arrival for surgery.   May have up to 8 ounces of water 4 hours prior to

## 2022-01-16 PROBLEM — I87.8 POOR VENOUS ACCESS: Status: ACTIVE | Noted: 2022-01-16

## 2022-01-17 ENCOUNTER — ANESTHESIA EVENT (OUTPATIENT)
Dept: OPERATING ROOM | Age: 72
End: 2022-01-17
Payer: MEDICARE

## 2022-01-17 RX ORDER — SODIUM CHLORIDE, SODIUM LACTATE, POTASSIUM CHLORIDE, CALCIUM CHLORIDE 600; 310; 30; 20 MG/100ML; MG/100ML; MG/100ML; MG/100ML
INJECTION, SOLUTION INTRAVENOUS ONCE
Status: CANCELLED | OUTPATIENT
Start: 2022-01-17

## 2022-01-18 ENCOUNTER — ANESTHESIA (OUTPATIENT)
Dept: OPERATING ROOM | Age: 72
End: 2022-01-18
Payer: MEDICARE

## 2022-01-18 ENCOUNTER — APPOINTMENT (OUTPATIENT)
Dept: GENERAL RADIOLOGY | Age: 72
End: 2022-01-18
Attending: SURGERY
Payer: MEDICARE

## 2022-01-18 ENCOUNTER — HOSPITAL ENCOUNTER (OUTPATIENT)
Age: 72
Setting detail: OUTPATIENT SURGERY
Discharge: HOME | End: 2022-01-18
Attending: SURGERY | Admitting: SURGERY
Payer: MEDICARE

## 2022-01-18 VITALS — OXYGEN SATURATION: 100 % | TEMPERATURE: 94.6 F | SYSTOLIC BLOOD PRESSURE: 137 MMHG | DIASTOLIC BLOOD PRESSURE: 66 MMHG

## 2022-01-18 VITALS
TEMPERATURE: 98.4 F | DIASTOLIC BLOOD PRESSURE: 79 MMHG | OXYGEN SATURATION: 100 % | HEIGHT: 63 IN | RESPIRATION RATE: 16 BRPM | WEIGHT: 140 LBS | SYSTOLIC BLOOD PRESSURE: 128 MMHG | BODY MASS INDEX: 24.8 KG/M2 | HEART RATE: 71 BPM

## 2022-01-18 DIAGNOSIS — I87.8 POOR VENOUS ACCESS: ICD-10-CM

## 2022-01-18 DIAGNOSIS — C50.412 CARCINOMA OF UPPER-OUTER QUADRANT OF LEFT FEMALE BREAST, UNSPECIFIED ESTROGEN RECEPTOR STATUS (HCC): ICD-10-CM

## 2022-01-18 PROCEDURE — 2580000003 HC RX 258: Performed by: ANESTHESIOLOGY

## 2022-01-18 PROCEDURE — 3700000001 HC ADD 15 MINUTES (ANESTHESIA): Performed by: SURGERY

## 2022-01-18 PROCEDURE — 2500000003 HC RX 250 WO HCPCS: Performed by: NURSE ANESTHETIST, CERTIFIED REGISTERED

## 2022-01-18 PROCEDURE — 6360000002 HC RX W HCPCS: Performed by: NURSE ANESTHETIST, CERTIFIED REGISTERED

## 2022-01-18 PROCEDURE — 7100000000 HC PACU RECOVERY - FIRST 15 MIN: Performed by: SURGERY

## 2022-01-18 PROCEDURE — 2580000003 HC RX 258: Performed by: SURGERY

## 2022-01-18 PROCEDURE — 3209999900 FLUORO FOR SURGICAL PROCEDURES

## 2022-01-18 PROCEDURE — 71045 X-RAY EXAM CHEST 1 VIEW: CPT

## 2022-01-18 PROCEDURE — 3600000003 HC SURGERY LEVEL 3 BASE: Performed by: SURGERY

## 2022-01-18 PROCEDURE — C1788 PORT, INDWELLING, IMP: HCPCS | Performed by: SURGERY

## 2022-01-18 PROCEDURE — 3600000013 HC SURGERY LEVEL 3 ADDTL 15MIN: Performed by: SURGERY

## 2022-01-18 PROCEDURE — 7100000010 HC PHASE II RECOVERY - FIRST 15 MIN: Performed by: SURGERY

## 2022-01-18 PROCEDURE — 7100000011 HC PHASE II RECOVERY - ADDTL 15 MIN: Performed by: SURGERY

## 2022-01-18 PROCEDURE — 88342 IMHCHEM/IMCYTCHM 1ST ANTB: CPT

## 2022-01-18 PROCEDURE — 88305 TISSUE EXAM BY PATHOLOGIST: CPT

## 2022-01-18 PROCEDURE — 6360000002 HC RX W HCPCS: Performed by: SURGERY

## 2022-01-18 PROCEDURE — 3700000000 HC ANESTHESIA ATTENDED CARE: Performed by: SURGERY

## 2022-01-18 PROCEDURE — 2500000003 HC RX 250 WO HCPCS: Performed by: SURGERY

## 2022-01-18 PROCEDURE — 7100000001 HC PACU RECOVERY - ADDTL 15 MIN: Performed by: SURGERY

## 2022-01-18 PROCEDURE — 2709999900 HC NON-CHARGEABLE SUPPLY: Performed by: SURGERY

## 2022-01-18 DEVICE — PORT INFUS 6FR SLIM POLYUR ATTCH OPN END SGL LUMN VEN CATH: Type: IMPLANTABLE DEVICE | Site: BREAST | Status: FUNCTIONAL

## 2022-01-18 RX ORDER — MAGNESIUM HYDROXIDE 1200 MG/15ML
LIQUID ORAL CONTINUOUS PRN
Status: COMPLETED | OUTPATIENT
Start: 2022-01-18 | End: 2022-01-18

## 2022-01-18 RX ORDER — ONDANSETRON 2 MG/ML
INJECTION INTRAMUSCULAR; INTRAVENOUS PRN
Status: DISCONTINUED | OUTPATIENT
Start: 2022-01-18 | End: 2022-01-18 | Stop reason: SDUPTHER

## 2022-01-18 RX ORDER — PROMETHAZINE HYDROCHLORIDE 25 MG/ML
6.25 INJECTION, SOLUTION INTRAMUSCULAR; INTRAVENOUS
Status: DISCONTINUED | OUTPATIENT
Start: 2022-01-18 | End: 2022-01-18 | Stop reason: HOSPADM

## 2022-01-18 RX ORDER — FENTANYL CITRATE 50 UG/ML
25 INJECTION, SOLUTION INTRAMUSCULAR; INTRAVENOUS EVERY 5 MIN PRN
Status: DISCONTINUED | OUTPATIENT
Start: 2022-01-18 | End: 2022-01-18 | Stop reason: HOSPADM

## 2022-01-18 RX ORDER — GLYCOPYRROLATE 0.2 MG/ML
INJECTION INTRAMUSCULAR; INTRAVENOUS PRN
Status: DISCONTINUED | OUTPATIENT
Start: 2022-01-18 | End: 2022-01-18 | Stop reason: SDUPTHER

## 2022-01-18 RX ORDER — DEXAMETHASONE SODIUM PHOSPHATE 4 MG/ML
INJECTION, SOLUTION INTRA-ARTICULAR; INTRALESIONAL; INTRAMUSCULAR; INTRAVENOUS; SOFT TISSUE PRN
Status: DISCONTINUED | OUTPATIENT
Start: 2022-01-18 | End: 2022-01-18 | Stop reason: SDUPTHER

## 2022-01-18 RX ORDER — PROPOFOL 10 MG/ML
INJECTION, EMULSION INTRAVENOUS CONTINUOUS PRN
Status: DISCONTINUED | OUTPATIENT
Start: 2022-01-18 | End: 2022-01-18 | Stop reason: SDUPTHER

## 2022-01-18 RX ORDER — ONDANSETRON 2 MG/ML
4 INJECTION INTRAMUSCULAR; INTRAVENOUS
Status: DISCONTINUED | OUTPATIENT
Start: 2022-01-18 | End: 2022-01-18 | Stop reason: HOSPADM

## 2022-01-18 RX ORDER — MIDAZOLAM HYDROCHLORIDE 1 MG/ML
INJECTION INTRAMUSCULAR; INTRAVENOUS PRN
Status: DISCONTINUED | OUTPATIENT
Start: 2022-01-18 | End: 2022-01-18

## 2022-01-18 RX ORDER — LABETALOL HYDROCHLORIDE 5 MG/ML
5 INJECTION, SOLUTION INTRAVENOUS EVERY 10 MIN PRN
Status: DISCONTINUED | OUTPATIENT
Start: 2022-01-18 | End: 2022-01-18 | Stop reason: HOSPADM

## 2022-01-18 RX ORDER — SODIUM CHLORIDE, SODIUM LACTATE, POTASSIUM CHLORIDE, CALCIUM CHLORIDE 600; 310; 30; 20 MG/100ML; MG/100ML; MG/100ML; MG/100ML
INJECTION, SOLUTION INTRAVENOUS CONTINUOUS
Status: DISCONTINUED | OUTPATIENT
Start: 2022-01-18 | End: 2022-01-18 | Stop reason: HOSPADM

## 2022-01-18 RX ORDER — FENTANYL CITRATE 50 UG/ML
INJECTION, SOLUTION INTRAMUSCULAR; INTRAVENOUS PRN
Status: DISCONTINUED | OUTPATIENT
Start: 2022-01-18 | End: 2022-01-18 | Stop reason: SDUPTHER

## 2022-01-18 RX ORDER — PROPOFOL 10 MG/ML
INJECTION, EMULSION INTRAVENOUS PRN
Status: DISCONTINUED | OUTPATIENT
Start: 2022-01-18 | End: 2022-01-18 | Stop reason: SDUPTHER

## 2022-01-18 RX ORDER — SODIUM CHLORIDE, SODIUM LACTATE, POTASSIUM CHLORIDE, AND CALCIUM CHLORIDE .6; .31; .03; .02 G/100ML; G/100ML; G/100ML; G/100ML
500 INJECTION, SOLUTION INTRAVENOUS ONCE
Status: COMPLETED | OUTPATIENT
Start: 2022-01-18 | End: 2022-01-18

## 2022-01-18 RX ORDER — HYDRALAZINE HYDROCHLORIDE 20 MG/ML
5 INJECTION INTRAMUSCULAR; INTRAVENOUS EVERY 10 MIN PRN
Status: DISCONTINUED | OUTPATIENT
Start: 2022-01-18 | End: 2022-01-18 | Stop reason: HOSPADM

## 2022-01-18 RX ORDER — LIDOCAINE HYDROCHLORIDE 10 MG/ML
INJECTION, SOLUTION INFILTRATION; PERINEURAL PRN
Status: DISCONTINUED | OUTPATIENT
Start: 2022-01-18 | End: 2022-01-18 | Stop reason: SDUPTHER

## 2022-01-18 RX ORDER — MEPERIDINE HYDROCHLORIDE 25 MG/ML
12.5 INJECTION INTRAMUSCULAR; INTRAVENOUS; SUBCUTANEOUS EVERY 5 MIN PRN
Status: DISCONTINUED | OUTPATIENT
Start: 2022-01-18 | End: 2022-01-18 | Stop reason: HOSPADM

## 2022-01-18 RX ORDER — KETAMINE HYDROCHLORIDE 50 MG/ML
INJECTION, SOLUTION, CONCENTRATE INTRAMUSCULAR; INTRAVENOUS PRN
Status: DISCONTINUED | OUTPATIENT
Start: 2022-01-18 | End: 2022-01-18 | Stop reason: SDUPTHER

## 2022-01-18 RX ORDER — MIDAZOLAM HYDROCHLORIDE 1 MG/ML
INJECTION INTRAMUSCULAR; INTRAVENOUS PRN
Status: DISCONTINUED | OUTPATIENT
Start: 2022-01-18 | End: 2022-01-18 | Stop reason: SDUPTHER

## 2022-01-18 RX ORDER — HEPARIN SODIUM (PORCINE) LOCK FLUSH IV SOLN 100 UNIT/ML 100 UNIT/ML
SOLUTION INTRAVENOUS PRN
Status: DISCONTINUED | OUTPATIENT
Start: 2022-01-18 | End: 2022-01-18 | Stop reason: ALTCHOICE

## 2022-01-18 RX ORDER — OXYCODONE HYDROCHLORIDE AND ACETAMINOPHEN 5; 325 MG/1; MG/1
1 TABLET ORAL
Status: DISCONTINUED | OUTPATIENT
Start: 2022-01-18 | End: 2022-01-18 | Stop reason: HOSPADM

## 2022-01-18 RX ADMIN — SODIUM CHLORIDE, SODIUM LACTATE, POTASSIUM CHLORIDE, AND CALCIUM CHLORIDE: .6; .31; .03; .02 INJECTION, SOLUTION INTRAVENOUS at 08:30

## 2022-01-18 RX ADMIN — FENTANYL CITRATE 12.5 MCG: 50 INJECTION, SOLUTION INTRAMUSCULAR; INTRAVENOUS at 10:20

## 2022-01-18 RX ADMIN — LIDOCAINE HYDROCHLORIDE 100 MG: 10 INJECTION, SOLUTION INFILTRATION; PERINEURAL at 09:49

## 2022-01-18 RX ADMIN — FENTANYL CITRATE 12.5 MCG: 50 INJECTION, SOLUTION INTRAMUSCULAR; INTRAVENOUS at 10:07

## 2022-01-18 RX ADMIN — ONDANSETRON 4 MG: 2 INJECTION INTRAMUSCULAR; INTRAVENOUS at 10:00

## 2022-01-18 RX ADMIN — PROPOFOL 50 MCG/KG/MIN: 10 INJECTION, EMULSION INTRAVENOUS at 09:50

## 2022-01-18 RX ADMIN — FAMOTIDINE 20 MG: 10 INJECTION, SOLUTION INTRAVENOUS at 09:41

## 2022-01-18 RX ADMIN — PROPOFOL 30 MG: 10 INJECTION, EMULSION INTRAVENOUS at 09:50

## 2022-01-18 RX ADMIN — GLYCOPYRROLATE 0.2 MG: 0.2 INJECTION INTRAMUSCULAR; INTRAVENOUS at 09:48

## 2022-01-18 RX ADMIN — FENTANYL CITRATE 12.5 MCG: 50 INJECTION, SOLUTION INTRAMUSCULAR; INTRAVENOUS at 10:41

## 2022-01-18 RX ADMIN — MIDAZOLAM HYDROCHLORIDE 1 MG: 2 INJECTION, SOLUTION INTRAMUSCULAR; INTRAVENOUS at 09:48

## 2022-01-18 RX ADMIN — KETAMINE HYDROCHLORIDE 10 MG: 50 INJECTION, SOLUTION INTRAMUSCULAR; INTRAVENOUS at 10:15

## 2022-01-18 RX ADMIN — FENTANYL CITRATE 12.5 MCG: 50 INJECTION, SOLUTION INTRAMUSCULAR; INTRAVENOUS at 10:35

## 2022-01-18 RX ADMIN — KETAMINE HYDROCHLORIDE 10 MG: 50 INJECTION, SOLUTION INTRAMUSCULAR; INTRAVENOUS at 09:52

## 2022-01-18 RX ADMIN — SODIUM CHLORIDE, POTASSIUM CHLORIDE, SODIUM LACTATE AND CALCIUM CHLORIDE 1000 ML: 600; 310; 30; 20 INJECTION, SOLUTION INTRAVENOUS at 09:08

## 2022-01-18 RX ADMIN — DEXAMETHASONE SODIUM PHOSPHATE 4 MG: 4 INJECTION, SOLUTION INTRAMUSCULAR; INTRAVENOUS at 09:55

## 2022-01-18 RX ADMIN — FENTANYL CITRATE 12.5 MCG: 50 INJECTION, SOLUTION INTRAMUSCULAR; INTRAVENOUS at 10:47

## 2022-01-18 RX ADMIN — FENTANYL CITRATE 12.5 MCG: 50 INJECTION, SOLUTION INTRAMUSCULAR; INTRAVENOUS at 10:28

## 2022-01-18 RX ADMIN — FENTANYL CITRATE 12.5 MCG: 50 INJECTION, SOLUTION INTRAMUSCULAR; INTRAVENOUS at 10:02

## 2022-01-18 RX ADMIN — PROPOFOL 20 MG: 10 INJECTION, EMULSION INTRAVENOUS at 09:55

## 2022-01-18 RX ADMIN — FENTANYL CITRATE 12.5 MCG: 50 INJECTION, SOLUTION INTRAMUSCULAR; INTRAVENOUS at 10:13

## 2022-01-18 RX ADMIN — KETAMINE HYDROCHLORIDE 10 MG: 50 INJECTION, SOLUTION INTRAMUSCULAR; INTRAVENOUS at 10:00

## 2022-01-18 ASSESSMENT — PULMONARY FUNCTION TESTS
PIF_VALUE: 1
PIF_VALUE: 0
PIF_VALUE: 1
PIF_VALUE: 0
PIF_VALUE: 0
PIF_VALUE: 1

## 2022-01-18 ASSESSMENT — PAIN DESCRIPTION - ONSET: ONSET: UNABLE TO TELL

## 2022-01-18 ASSESSMENT — PAIN DESCRIPTION - DESCRIPTORS
DESCRIPTORS: SORE
DESCRIPTORS: ACHING

## 2022-01-18 ASSESSMENT — PAIN - FUNCTIONAL ASSESSMENT
PAIN_FUNCTIONAL_ASSESSMENT: 0-10
PAIN_FUNCTIONAL_ASSESSMENT: ACTIVITIES ARE NOT PREVENTED

## 2022-01-18 ASSESSMENT — PAIN DESCRIPTION - ORIENTATION
ORIENTATION: LEFT
ORIENTATION: LEFT

## 2022-01-18 ASSESSMENT — PAIN DESCRIPTION - LOCATION
LOCATION: BREAST
LOCATION: BREAST

## 2022-01-18 ASSESSMENT — PAIN DESCRIPTION - PAIN TYPE
TYPE: SURGICAL PAIN
TYPE: SURGICAL PAIN

## 2022-01-18 ASSESSMENT — PAIN SCALES - GENERAL
PAINLEVEL_OUTOF10: 2
PAINLEVEL_OUTOF10: 0
PAINLEVEL_OUTOF10: 2
PAINLEVEL_OUTOF10: 3

## 2022-01-18 ASSESSMENT — PAIN DESCRIPTION - FREQUENCY: FREQUENCY: INTERMITTENT

## 2022-01-18 ASSESSMENT — PAIN DESCRIPTION - PROGRESSION: CLINICAL_PROGRESSION: OTHER (COMMENT)

## 2022-01-18 NOTE — ANESTHESIA PRE PROCEDURE
receptor positive ([facility] C50.412, Z17.0    Poor venous access I87.8       Past Medical History:        Diagnosis Date    Breast cancer ([facility] 2004    right    Depression     Hyperlipidemia     Hypothyroid     Sleep apnea     wears CPAP       Past Surgical History:        Procedure Laterality Date    BREAST BIOPSY Left 11/2021    BREAST BIOPSY Left 12/14/2021    LEFT BREAST LUMPECTOMY, LEFT BREAST SENTINEL NODE BIOPSY (2:00)PM, COMPLETION AXILLARY DISSECTION, ULTRASOUND LATERALITY, LEFT BREAST 2 RADIOFREQUENCY TAGS, 1:00 5CM FTN AND 2:00 7CM FTN, TARGET MASS X 2 (12/9/2021) 10:30AM performed by Murtaza Lopez MD at [facility] [address] Left 12/14/2021    .  performed by Murtaza Lopez MD at [facility] Right    [address] Left 1982    orbit repair   [address] Left     TONSILLECTOMY      US GUIDED NEEDLE LOC OF LEFT BREAST Left 12/9/2021    US GUIDED NEEDLE LOC OF LEFT BREAST 12/9/2021 [facility]TR[facility]    US GUIDED NEEDLE LOC OF LEFT BREAST Left 12/9/2021    US GUIDED NEEDLE LOC OF LEFT BREAST 12/9/2021 [facility] ULTRASOUND       Social History:    Social History     Tobacco Use    Smoking status: Never Smoker    Smokeless tobacco: Never Used   Substance Use Topics    Alcohol use: Yes     Comment: rarely                                Counseling given: Not Answered      Vital Signs (Current):   Vitals:    01/18/22 0827   BP: 113/77   Pulse: 63   Resp: 16   Temp: 97.7 °F (36.5 °C)   TempSrc: Tympanic   SpO2: 97%   Weight: 140 lb (63.5 kg)   Height: 5' 3\" (1.6 m)                                              BP Readings from Last 3 Encounters:   01/18/22 113/77   12/14/21 (!) 165/82   12/14/21 113/73       NPO Status:                                                                                 BMI:   Wt Readings from Last 3 Encounters:   01/18/22 140 lb (63.5 kg)   12/14/21 140 lb (63.5 kg)     Body mass index is 24.8 kg/m². CBC: No results found for: WBC, RBC, HGB, HCT, MCV, RDW, PLT    CMP: No results found for: NA, K, CL, CO2, BUN, CREATININE, GFRAA, AGRATIO, LABGLOM, GLUCOSE, GLU, PROT, CALCIUM, BILITOT, ALKPHOS, AST, ALT    POC Tests: No results for input(s): POCGLU, POCNA, POCK, POCCL, POCBUN, POCHEMO, POCHCT in the last 72 hours. Coags: No results found for: PROTIME, INR, APTT    HCG (If Applicable): No results found for: PREGTESTUR, PREGSERUM, HCG, HCGQUANT     ABGs: No results found for: PHART, PO2ART, GWC3RTF, RAT0BZO, BEART, B9AUXYDQ     Type & Screen (If Applicable):  No results found for: LABABO, LABRH    Drug/Infectious Status (If Applicable):  No results found for: HIV, HEPCAB    COVID-19 Screening (If Applicable): No results found for: COVID19        Anesthesia Evaluation  Patient summary reviewed and Nursing notes reviewed no history of anesthetic complications:   Airway: Mallampati: II  TM distance: >3 FB   Neck ROM: full  Mouth opening: > = 3 FB Dental: normal exam     Comment: caps    Pulmonary:normal exam    (+) sleep apnea: on CPAP,                             Cardiovascular:    (+) hypertension:,                   Neuro/Psych:   (+) psychiatric history:            GI/Hepatic/Renal: Neg GI/Hepatic/Renal ROS            Endo/Other:    (+) hypothyroidism::., .                 Abdominal:             Vascular: negative vascular ROS. Other Findings:             Anesthesia Plan      MAC     ASA 3       Induction: intravenous. MIPS: Postoperative opioids intended. Anesthetic plan and risks discussed with patient. Plan discussed with CRNA.     Attending anesthesiologist reviewed and agrees with Preprocedure content              Blanca Burch MD   1/18/2022

## 2022-01-18 NOTE — H&P
1998    PAROTIDECTOMY Left     TONSILLECTOMY      US GUIDED NEEDLE LOC OF LEFT BREAST Left 12/9/2021    US GUIDED NEEDLE LOC OF LEFT BREAST 12/9/2021 [facility] ULTRAS[facility]    US GUIDED NEEDLE LOC OF LEFT BREAST Left 12/9/2021    US GUIDED NEEDLE LOC OF LEFT BREAST 12/9/2021 [facility] ULTRASOUND       Medications Prior to Admission:   Prior to Admission medications    Medication Sig Start Date End Date Taking? Authorizing Provider   Venlafaxine HCl (EFFEXOR XR PO) Take 187.5 mg by mouth daily   Yes Historical Provider, MD   atomoxetine (STRATTERA) 40 MG capsule Take 40 mg by mouth daily   Yes Historical Provider, MD   amLODIPine (NORVASC) 10 MG tablet Take 10 mg by mouth daily   Yes Historical Provider, MD   levothyroxine (SYNTHROID) 50 MCG tablet Take 50 mcg by mouth Daily   Yes Historical Provider, MD   gabapentin (NEURONTIN) 100 MG capsule Take 100 mg by mouth nightly. 2 tabs   Yes Historical Provider, MD   Cholecalciferol (VITAMIN D3) 50 MCG (2000 UT) CAPS Take by mouth daily   Yes Historical Provider, MD   calcium carbonate 600 MG TABS tablet Take 1 tablet by mouth daily   Yes Historical Provider, MD   famotidine (PEPCID) 20 MG tablet Take 20 mg by mouth as needed   Yes Historical Provider, MD   ZOCOR 20 MG tablet TAKE ONE TABLET BY MOUTH EVERY NIGHT AT BEDTIME 12/15/11  Yes Celsa Johns MD       Allergies:  Patient has no known allergies.     Social History:   Social History     Socioeconomic History    Marital status:      Spouse name: None    Number of children: None    Years of education: None    Highest education level: None   Occupational History    None   Tobacco Use    Smoking status: Never Smoker    Smokeless tobacco: Never Used   Substance and Sexual Activity    Alcohol use: Yes     Comment: rarely    Drug use: Never    Sexual activity: None   Other Topics Concern    None   Social History Narrative    None     Social Determinants of Health     Financial Resource Strain:     Difficulty of Paying Living Expenses: Not on file   Food Insecurity:     Worried About Running Out of Food in the Last Year: Not on file    Ran Out of Food in the Last Year: Not on file   Transportation Needs:     Lack of Transportation (Medical): Not on file    Lack of Transportation (Non-Medical): Not on file   Physical Activity:     Days of Exercise per Week: Not on file    Minutes of Exercise per Session: Not on file   Stress:     Feeling of Stress : Not on file   Social Connections:     Frequency of Communication with Friends and Family: Not on file    Frequency of Social Gatherings with Friends and Family: Not on file    Attends Islam Services: Not on file    Active Member of [address] [place] [address] or Organizations: Not on file    Attends Club or Organization Meetings: Not on file    Marital Status: Not on file   Intimate Partner Violence:     Fear of Current or Ex-Partner: Not on file    Emotionally Abused: Not on file    Physically Abused: Not on file    Sexually Abused: Not on file   Housing Stability:     Unable to Pay for Housing in the Last Year: Not on file    Number of [place] in the Last Year: Not on file    Unstable Housing in the Last Year: Not on file         Family History:   History reviewed. No pertinent family history.       REVIEW OF SYSTEMS:    Constitutional: Negative for chills, fatigue and fever   HENT: Negative for loss of hearing   Eyes: Negative for visual disturbance  Respiratory: Negative for shortness of breath and wheezing    Cardiovascular: Negative for chest pain, palpitations and exertional chest pressure  Gastrointestinal: Negative for constipation, diarrhea, nausea and vomiting   Musculoskeletal: Negative for gait problem, and joint swelling    Skin: Negative for rash and nonhealing wounds   Neurological: Negative for dizziness, syncope, light-headedness    Hematological: Does not bruise/bleed easily   Psychiatric/Behavioral: Negative for agitation    PHYSICAL EXAM: /77   Pulse 63   Temp 97.7 °F (36.5 °C) (Tympanic)   Resp 16   Ht 5' 3\" (1.6 m)   Wt 140 lb (63.5 kg)   LMP  (LMP Unknown)   SpO2 97%   BMI 24.80 kg/m²  I      Eyes:  pupils equal, round and reactive to light and conjunctiva normal    Head/ENT:  Normocephalic, without obvious abnormality, atramatic,     Neck:  Supple, symmetrical, trachea midline, no adenopathy, no tenderness, skin warm and dry. Heart: Regular rate and rhythm    Lungs:  No increased work of breathing, good air exchange, clear to auscultation bilaterally, no crackles or wheezing    Abdomen:  Soft, non-distended, non-tender, no masses palpated    Extremities:  No clubbing, cyanosis, or edema      ASSESSMENT AND PLAN:    1. Patient is a 70 y.o. female with above specified procedure planned. 2.  Access to ancillary services are available per request of the provider.     THALIA Rodgers - CNP   1/18/2022

## 2022-01-18 NOTE — PROGRESS NOTES
PACU Transfer to [facility]    Vitals:    01/18/22 1230   BP: 131/69   Pulse: 66   Resp: 13   Temp: 97 °F (36.1 °C)   SpO2: 93%         Intake/Output Summary (Last 24 hours) at 1/18/2022 1242  Last data filed at 1/18/2022 1242  Gross per 24 hour   Intake 1500 ml   Output 20 ml   Net 1480 ml       Pain assessment:   Pain Level: 2    Patient transferred to care of [facility] RN.    1/18/2022 12:42 PM

## 2022-01-18 NOTE — H&P
The patient was identified and examined. The surgical site was marked. Interval changes in health status since H&P was performed: presumed flu and COVID positive 1/1/2022 . The patient is cleared to proceed as scheduled.

## 2022-01-18 NOTE — ANESTHESIA POSTPROCEDURE EVALUATION
Department of Anesthesiology  Postprocedure Note    Patient: Amara Murphy  MRN: [id number]  YOB: 1950  Date of evaluation: 1/18/2022  Time:  1:11 PM     Procedure Summary     Date: 01/18/22 Room / Location: [facility] [address] / [facility]    Anesthesia Start: 0945 Anesthesia Stop: 1107    Procedures:       RE-EXCISION LEFT LUMPECTOMY; (Left Breast)      RIGHT SUBCLAVIAN PORT PLACEMENT (Right Chest) Diagnosis:       Poor venous access      Carcinoma of upper-outer quadrant of left female breast, unspecified estrogen receptor status ([facility]      (Poor venous access [I87.8]  Carcinoma of upper-outer quadrant of left female breast, unspecified estrogen receptor status ([facility] [C50.412])    Surgeons: Jeff Penny MD Responsible Provider: Raul Lewis MD    Anesthesia Type: MAC ASA Status: 3          Anesthesia Type: MAC    Shahnaz Phase I: Shahnaz Score: 10    Shahnaz Phase II:      Last vitals: Reviewed and per EMR flowsheets.        Anesthesia Post Evaluation    Patient location during evaluation: PACU  Patient participation: complete - patient participated  Level of consciousness: awake and alert  Airway patency: patent  Nausea & Vomiting: no nausea and no vomiting  Complications: no  Cardiovascular status: hemodynamically stable  Respiratory status: acceptable  Hydration status: euvolemic

## 2022-01-18 NOTE — PROGRESS NOTES
Soda and maru offered, xray reviewed, no pneumothorax, Dr. Celina Dick notified the results in the OR, ok to be discharged

## 2022-01-18 NOTE — PROGRESS NOTES
Ambulatory Surgery/Procedure Discharge Note    Vitals:    01/18/22 1328   BP: 128/79   Pulse: 71   Resp: 16   Temp: 98.4 °F (36.9 °C)   SpO2: 100%       In: 1590 [P.O.:290; I.V.:1300]  Out: 20     Restroom use offered before discharge. Yes - patient is walking to the bathroom now. Pain assessment:  present - adequately treated with an ice pack  Pain Level: 3    Patient is getting dressed now. Her spouse is parking in [address]. Once she is ready, he will retrieve the car. Patient discharged to home/self care. Patient discharged via wheel chair by Samson Wells RN, to waiting family. 1/18/2022 1415    1328 Patient arrived to Phase II with her spouse at her side. Patient is s/p x-ray of port. Patient has minimal pain and states that she doesn't have any nausea. Patient is eating peggy crackers and drinking a soda. Her port on her right upper chest is C/D/I without drainage. Her left breast incision is C/D/I without drainage. See vital signs flow sheet. [address] [facility] on her right upper chest is C/D/I without drainage. Her left breast   incision is C/D/I without drainage. [facility] on her right upper chest is C/D/I without drainage. Her left breast   incision is C/D/I without drainage. [address] [place] [address] on her right upper chest is C/D/I without drainage. Her left breast   incision is C/D/I without drainage.

## 2022-01-18 NOTE — BRIEF OP NOTE
Brief Postoperative Note      Patient: Donya Oliver  YOB: 1950  MRN: [id number]    Date of Procedure: 1/18/2022    Pre-Op Diagnosis: Poor venous access [I87.8]  Carcinoma of upper-outer quadrant of left female breast, unspecified estrogen receptor status ([facility] [C50.412]    Post-Op Diagnosis: Same       Procedure(s):  RE-EXCISION LEFT LUMPECTOMY;  RIGHT SUBCLAVIAN PORT PLACEMENT    Surgeon(s):  Liliya Barry MD    Assistant:  Surgical Assistant: Jericho Garay    Anesthesia: Monitor Anesthesia Care    Estimated Blood Loss (mL): less than 50     Complications: None    Specimens:   ID Type Source Tests Collected by Time Destination   A : INFERIOR MARGIN LEFT LUMPECTOMY SUTURE MARKS TRUE MARGIN Tissue Tissue SURGICAL PATHOLOGY Liliya Barry MD 1/18/2022 1035    B : SUPERIOR MARGIN LEFT LUMPECTOMY SUTURE MARKS TRUE MARGIN Tissue Tissue SURGICAL PATHOLOGY Liliya Barry MD 1/18/2022 1040        Implants:  Implant Name Type Inv. Item Serial No.  Lot No. LRB No. Used Action   PORT INFUS 6FR SLIM POLYUR ATTCH OPN END SGL LUMN ANKUR CATH  PORT INFUS 6FR SLIM POLYUR ATTCH OPN END SGL LUMN ANKUR CATH  [facility] [id number]  1 Implanted         Drains:   [REMOVED] Closed/Suction Drain Inferior; Lateral;Left Breast Bulb 15 [address] (Removed)       Findings: tip of catheter in SVC, small lumpectomy cavity    Electronically signed by Liliya Barry MD on 1/18/2022 at 11:33 AM

## 2022-01-18 NOTE — PROGRESS NOTES
Patient arrived from OR to PACU # 13 s/p  RE-EXCISION LEFT LUMPECTOMY; (Left Breast) RIGHT SUBCLAVIAN PORT PLACEMENT (Right Chest) per . Attached to PACU monitoring device, report received from CRNA who reported no problems intraoperatively. Arrived drowsy from anesthesia, VSS.

## 2022-01-19 NOTE — OP NOTE
[facility] [address], [address]                                OPERATIVE REPORT    PATIENT NAME: Nubia Wall             :        1950  MED REC NO:   [id number]                          ROOM:  ACCOUNT NO:   [de-identified]                           ADMIT DATE: 2022  PROVIDER:     Saundra Aguilar MD    DATE OF PROCEDURE:  2022    PREOPERATIVE DIAGNOSES:  Left breast cancer, upper outer quadrant, poor  venous access. POSTOPERATIVE DIAGNOSES:  Left breast cancer, upper outer quadrant, poor  venous access. PROCEDURE:  Re-excision of left lumpectomy and placement of right  subclavian port with intraoperative fluoroscopy. SURGEON:  Saundra Aguilar MD    ANESTHESIA:  MAC. ASSISTANT:  Joe Medrano. BLOOD LOSS:  Less than 50 mL. COMPLICATIONS:  None. SPECIMENS:  Inferior margin left lumpectomy and superior margin left  lumpectomy. INDICATIONS:  The patient is a 79-year-old woman who was found to have  locally advanced left breast cancer for which adjuvant chemotherapy has  been recommended. She presents now for placement of a port for venous  access. She was also found to have focally positive superior and  inferior margins and elected to proceed with re-excision at this time. DESCRIPTION OF PROCEDURE:  The patient was taken to the operating room,  placed in the supine position. After administration of IV sedation, the  right upper chest and left breast were prepped and draped in the usual  sterile fashion. We began on the right with the port placement. The  right subclavian vein was accessed on the first attempt. As we were  withdrawing the needle, we dislodged the wire. We made a second attempt  and once again obtained good venous return. At this time, we passed the  wire successfully into the superior vena cava and verified the location  under fluoroscopic guidance.   After we prepped and draped in the usual  sterile fashion, we created a field block using 1% lidocaine and 0.5%  Marcaine mixed in equal parts. Then we go on to the stick and the wire  withdrawn. After we created the transverse incision, we created a  subcutaneous pocket using electrocautery. We placed two 2-0 silk  sutures through the port and pectoralis fascia and left them untied. A  dilator sheath was passed over the wire. The wire and the dilator were  then withdrawn. We passed a catheter through the sheath which was then  torn away. After positioning the tip of the catheter in the superior  vena cava using fluoroscopic guidance, the catheter was cut to the  appropriate length and connected to the port. The port was delivered  into the subcutaneous pocket and the tacking sutures were tied. The  incision was closed in two layers with interrupted deep dermal sutures  followed by a 4-0 Monocryl subcuticular skin closure. The port was  accessed with good venous return and flushed with 20 mL of saline and 5  mL of heparin flush. We turned our attention to the left breast and  once again created a field block using 1% lidocaine and 0.5% Marcaine  mixed in equal parts. We opened the previous lumpectomy incision,  extended it through the subcutaneous tissues where we encountered the  lumpectomy cavity with a small amount of fluid with a small seroma. We  then proceeded with re-excision of the superior and inferior margins. The true margin was indicated by a double suture. The specimens were  placed in formalin for permanent section. The surgical site was  inspected for hemostasis. Bleeding points were controlled using  electrocautery. We closed the incision with interrupted 3-0 Vicryl deep  dermal sutures and a 4-0 Monocryl subcuticular skin closure. Both  incisions were sealed with Dermabond. The patient was taken to recovery  in satisfactory condition having tolerated the procedure well.   The port  placed was a 6-[employer] [employer] PowerPort. Postoperative chest x-ray  confirmed the absence of pneumothorax and the location of the tip of the  catheter in the superior vena cava.         Deric Cervantes MD    D: 01/18/2022 21:41:55       T: 01/18/2022 21:45:38     [email or web address]  Job#: [id number]     Doc#: [id number]    CC:

## 2022-02-18 LAB
Lab: NORMAL
REPORT: NORMAL
THIS TEST SENT TO: NORMAL

## 2022-03-04 LAB
Lab: NORMAL
REPORT: NORMAL
THIS TEST SENT TO: NORMAL

## 2022-05-10 ENCOUNTER — TELEPHONE (OUTPATIENT)
Dept: SURGERY | Age: 72
End: 2022-05-10

## 2022-05-10 NOTE — TELEPHONE ENCOUNTER
Left message for patient to call the office to schedule appointment. Referral from Dr. Krista Noble. Breast cancer reconstruction.

## 2022-05-11 ENCOUNTER — OFFICE VISIT (OUTPATIENT)
Dept: SURGERY | Age: 72
End: 2022-05-11
Payer: MEDICARE

## 2022-05-11 VITALS
OXYGEN SATURATION: 100 % | TEMPERATURE: 97.8 F | RESPIRATION RATE: 16 BRPM | WEIGHT: 152 LBS | SYSTOLIC BLOOD PRESSURE: 107 MMHG | DIASTOLIC BLOOD PRESSURE: 88 MMHG | HEIGHT: 63 IN | BODY MASS INDEX: 26.93 KG/M2 | HEART RATE: 85 BPM

## 2022-05-11 DIAGNOSIS — C50.912 MALIGNANT NEOPLASM OF LEFT FEMALE BREAST, UNSPECIFIED ESTROGEN RECEPTOR STATUS, UNSPECIFIED SITE OF BREAST (HCC): Primary | ICD-10-CM

## 2022-05-11 PROCEDURE — G8427 DOCREV CUR MEDS BY ELIG CLIN: HCPCS | Performed by: SURGERY

## 2022-05-11 PROCEDURE — 99205 OFFICE O/P NEW HI 60 MIN: CPT | Performed by: SURGERY

## 2022-05-11 PROCEDURE — 4040F PNEUMOC VAC/ADMIN/RCVD: CPT | Performed by: SURGERY

## 2022-05-11 PROCEDURE — 1090F PRES/ABSN URINE INCON ASSESS: CPT | Performed by: SURGERY

## 2022-05-11 PROCEDURE — G8400 PT W/DXA NO RESULTS DOC: HCPCS | Performed by: SURGERY

## 2022-05-11 PROCEDURE — 3017F COLORECTAL CA SCREEN DOC REV: CPT | Performed by: SURGERY

## 2022-05-11 PROCEDURE — G8417 CALC BMI ABV UP PARAM F/U: HCPCS | Performed by: SURGERY

## 2022-05-11 PROCEDURE — 1123F ACP DISCUSS/DSCN MKR DOCD: CPT | Performed by: SURGERY

## 2022-05-11 PROCEDURE — 1036F TOBACCO NON-USER: CPT | Performed by: SURGERY

## 2022-05-11 RX ORDER — ANASTROZOLE 1 MG/1
1 TABLET ORAL
COMMUNITY
Start: 2021-12-22

## 2022-05-11 NOTE — PROGRESS NOTES
[facility]    CC: Breast CA     Referring physician: Kathia Au MD    HPI: This is a 67 y.o. female with a PMHx as delineated below who presents in consultation for breast reconstruction. She was found to have left breast cancer and desires to proceed with unilateral mastectomy with reconstruction. She has a previous history of right breast cancer approximately 18 years ago undergoing lumpectomy with chemotherapy and radiation. Plastic surgery was consulted for evaluation and treatment. The specifics of her breast cancer workup / treatment is as follows:     Diagnosis: invasive lobular  Mo/Yr Diagnosed: 9/21  Breast Involved:Left  Surgery: Right lumpectomy with re-excision with posterior positive margin  Date of Surgery: 1/22  Tumor Size & Grade: 1; [id number]  Ellie status: Positive  ER: Positive LA: Positive HER2: Negative    Oncologist: Jaqueline Salinas MD  Radiation: YES (HAD ON THE RIGHT BREAST 18 YEARS AGO; WILL NEED ADJUVANT RADIATION ON THE RIGHT)    Chemo/Meds: completed chemotherapy (4/22/22)  Pregnancy/Miscarriages: 1 / 0    PMHx:   Past Medical History:   Diagnosis Date    Breast cancer ([address] 2004    right    Depression     Hyperlipidemia     Hypothyroid     Sleep apnea     wears CPAP     PSHx:   Past Surgical History:   Procedure Laterality Date    BREAST BIOPSY Left 11/2021    BREAST BIOPSY Left 12/14/2021    LEFT BREAST LUMPECTOMY, LEFT BREAST SENTINEL NODE BIOPSY (2:00)PM, COMPLETION AXILLARY DISSECTION, ULTRASOUND LATERALITY, LEFT BREAST 2 RADIOFREQUENCY TAGS, 1:00 5CM FTN AND 2:00 7CM FTN, TARGET MASS X 2 (12/9/2021) 10:30AM performed by Lindsey [place] MD at [address] Left 12/14/2021    .  performed by Lindsey [place] MD at [address] Left 1/18/2022    RE-EXCISION LEFT LUMPECTOMY; performed by Lindsey [place] MD at [address] Right    [facility] repair    HYSTERECTOMY  1998    PAROTIDECTOMY Left     PORT SURGERY Right 1/18/2022    RIGHT SUBCLAVIAN PORT PLACEMENT performed by Americo Drummond MD at [address] LOC OF LEFT BREAST Left 12/9/2021    US GUIDED NEEDLE LOC OF LEFT BREAST 12/9/2021 [address] [facility] ULTRASOUND    US GUIDED NEEDLE LOC OF LEFT BREAST Left 12/9/2021    US GUIDED NEEDLE LOC OF LEFT BREAST 12/9/2021 [facility] ULTRASOUND     Allergies: No Known Allergies  FHx: Past history of breast CA: Yes (sister)    Meds:   Current Outpatient Medications   Medication Sig Dispense Refill    Venlafaxine HCl (EFFEXOR XR PO) Take 187.5 mg by mouth daily      atomoxetine (STRATTERA) 40 MG capsule Take 40 mg by mouth daily      amLODIPine (NORVASC) 10 MG tablet Take 10 mg by mouth daily      levothyroxine (SYNTHROID) 50 MCG tablet Take 50 mcg by mouth Daily      gabapentin (NEURONTIN) 100 MG capsule Take 100 mg by mouth nightly. 2 tabs      Cholecalciferol (VITAMIN D3) 50 MCG (2000 UT) CAPS Take by mouth daily      calcium carbonate 600 MG TABS tablet Take 1 tablet by mouth daily      famotidine (PEPCID) 20 MG tablet Take 20 mg by mouth as needed      ZOCOR 20 MG tablet TAKE ONE TABLET BY MOUTH EVERY NIGHT AT BEDTIME 30 tablet 1     No current facility-administered medications for this visit. SocHx: Smoking: No    ETOH: none    Drug use: Yes (marijuana - for nausea during chemotherapy)    ROS   Constitutional: Negative for chills and fever. HENT: Negative for congestion, facial swelling, and voice change. Eyes: Negative for photophobia and visual disturbance. Respiratory: Negative for apnea, cough, chest tightness and shortness of breath. Cardiovascular: Negative for chest pain and palpitations. Gastrointestinal: Negative for dysphagia and early satiety. Genitourinary: Negative for difficulty urinating, dysuria, flank pain, frequency and hematuria.    Musculoskeletal: Negative for new gait problem, joint swelling and myalgias. Skin: Negative for color change, pallor and rash. Endocrine: negative for tremors, temperature intolerance or polydipsia. Allergic/Immunologic: Negative for new environmental or food allergies. Neurological: Negative for dizziness, seizures, speech difficulty, numbness. Hematological: Negative for adenopathy. Psychiatric/Behavioral: Negative for agitation and confusion. EXAM  /88   Pulse 85   Temp 97.8 °F (36.6 °C)   Resp 16   Ht 5' 3\" (1.6 m)   Wt 152 lb (68.9 kg)   SpO2 100%   BMI 26.93 kg/m²     GEN: NAD, pleasant, obese/healthy  CVS: RRR  PULM: No respiratory distress  HEENT: PERRLA/EOMI; hearing appears within normal limits  NECK: Supple with trachea in midline, no masses  EXT: No lymphedema noted  ABD: soft/NT/ND   NEURO: No focal deficits, no obvious CN deficits  BACK: Bilateral latiss muscle intact  BREAST:   Physical Exam    Bra size: 36C/D  Desired bra size: Slightly smaller; symmetry with the contralateral right breast  Ptosis grade:   R: 2   L: 2  The left breast size is larger than the right breast.  There were no palpable masses with no palpable lymphadenopathy in the ipsilateral left axilla. Nipple retraction: No    Contour deformity on the right inferolateral segment from prior lumpectomy. Healed left transverse superolateral incision. Left breast sternal notch to nipple: 25 cm  Right breast sternal notch to nipple: 22.8 cm    Left breast nipple to inframammary fold: 8 cm  Right breast nipple to inframammary fold: 7 cm    Left breast width 17.2 cm  Right breast width 14.1 cm    IMAGING: Reviewed    IMP: 67 y.o. female with breast cancer who presents for discussion regarding reconstruction options  PLAN: Discussed options with Kailee Tejeda. Given that she will need PMRT, will plan for immediate reconstruction with TE followed by likely delayed, immediate unilateral FLAQUITA. Will work with Dr. Patito Davalos and schedule.     A discussion regarding surgical options including immediate vs delayed approaches, implant based vs autologous, as well as additional planned revisional surgeries was performed with the patient and family. Multiple autologous donor sites were discussed as well. Clinical photos were obtained. We additionally discussed the FDA recommendations regarding monitoring of silicone implants. The risks, benefits, alternatives, outcomes, personnel involved were discussed. Specifically, the risks including, but not limited to: bleeding that may necessitate transfusion or operation, infection, seroma, reoperation, nonhealing, poor cosmetic outcome, asymmetry, scarring, partial or total flap loss, donor site morbidity, VTE (DVT/PE), and death were discussed. All questions were answered in a satisfactory manner according to the patient. The patient was counseled at length about the risks of samantha Covid-19 during their perioperative period and any recovery window from their procedure. The patient was made aware that samantha Covid-19  may worsen their prognosis for recovering from their procedure  and lend to a higher morbidity and/or mortality risk. All material risks, benefits, and reasonable alternatives including postponing the procedure were discussed. The patient does wish to proceed with the procedure at this time. Total encounter time: 60 min with > 50% spent with face to face (or vitual) counseling and coordination of care.     Sahra Ferrari MD  [facility]  05/11/22

## 2022-05-11 NOTE — LETTER
Surgery Schedule Request Form  [facility]  [address] [place] [address]. [place] [address] [place] [address] Av  DATE OF SURGERY: 2022     TIME OF SURGERY: 7:30 am      Confirmation#:__________________        Surgeon Name: Ty Cardozo MD    Phone: [phone]     Fax: [phone]  Co-Case Additional Surgeon: Valentina Rivera MD  Procedure Name: 1) Left breast reconstruction with stage 1 tissue expander placement with Alloderm  CPT CODES (required for scheduling): 04208 & [phone]  DIAGNOSIS: C50.912  Breast Cancer    LENGTH OF PROCEDURE: 1 hour  Patient Status: 23 hour observation    Labs Needed:   CBC _x__  PT/PTT_x__ INR __x__  CMP _x__ EKG __x_   Urine Hcg ___            PATIENT NAME: Julio Cesar Lopes            YOB: 1950  SEX: female   SS #:   PHONE: [phone] (home)     Pre-Op to be done by: PCP  Cardiac Clearance Done by: per PCP    Pt Position:  supine  Patient to meet with Anesthesiology prior to surgery: no     Medications to be stopped 5 days before surgery: anticoagulation     Ancef 2 gm IV OCTOR (NOTE:  If patient weight is > 120 kg, Administer 3 gm)  Other Orders: Transexemic acid 1g IV OCTOR; No Decadron; SA    INSURANCE: Medicare A&B                                              SUBSCRIBER NAME: Self   MEMBER ID:   [id number]                                              AUTHORIZATION #: CPT Codes [id number] and [phone] do not require pre certification. PCP: Emmanuel Dick MD                                      ANESTHESIA:  General    Ty Cardozo MD                             FAX TO: [phone]   QUESTIONS? CALL: Dora Stanton   Fax   [phone]            Date Of Procedure: 2022    PATIENT:       Julio Cesar Lopes                    :  1950      No Known Allergies    No.   PHYSICIAN ORDERS   HUC/  RN      ORDERS WITH CHECK BOXES MUST BE SELECTED. ALL OTHER ORDERS WILL BE AUTOMATICALLY INITIATED.            Date / Time of Order:   5/17/22   9:22 AM          Procedure:  Left breast reconstruction with stage 1 tissue expander placement with Alloderm         1. Cefazolin (Ancef) 2 gm IV OCTOR   ** NOTE:  If patient weight is > 120 kg, Administer 3 gm         2. ** If PCN allergy, then Clindamycin 600mg IV OCTOR.   ** If prior history of MRSA, Vancomycin 1g IV OCTOR (please check with renal function prior to administration)       3.  Other orders:  Transexemic acid 1g IV OCTOR; No Decadron; SA     Physician / Surgeon:  Beckie Olszewski, MD  Office Ph:  [phone]       Physician Signature:     9/07

## 2022-05-12 ENCOUNTER — TELEPHONE (OUTPATIENT)
Dept: SURGERY | Age: 72
End: 2022-05-12

## 2022-05-12 NOTE — TELEPHONE ENCOUNTER
The patient was in the office to see Dr. Kim Melo yesterday. PLAN: Discussed options with Kailee Tejeda. Given that she will need PMRT, will plan for immediate reconstruction with TE followed by likely delayed, immediate unilateral FLAQUITA. Will work with Dr. Patito Davalos and schedule. I received a surgery letter. The patient has Medicare A&B as her primary insurance. CPT Codes 94453 and 58165 do not require pre certification. I spoke with Caroline Serrano at [address] [place] [address] ([phone]) to see if they require pre certification with Medicare as primary insurance. [facility] follows Medicare guidelines so no pre certification or pre authorization is required with this plan. Call Reference # [id number]    I lmom for the patient at the home number listed. I requested a call back to discuss insurance and surgery scheduling. I will leave this phone note open.

## 2022-05-13 NOTE — TELEPHONE ENCOUNTER
Patient called in wanting to speak to the office regarding her surgery and insurance    Patient states that she will be unavailable until 2pm today, 5/13/2022    Patient states that it is okay to leave a message    Please contact the patient at [phone]

## 2022-05-13 NOTE — TELEPHONE ENCOUNTER
I spoke with the patient at the home number listed. The patient is now scheduled for surgery with  on 6-7-2022. The patient is aware of H&P. The patient is scheduled for her post op appointment 6-. I will submit the surgery letter today. I will fax the Alloderm order today. I will scan the order and the fax success into Epic under the media tab. The patient will  her surgery information and instructions next week at Breast Cancer Education. I will close this phone note.

## 2022-05-19 ENCOUNTER — NURSE ONLY (OUTPATIENT)
Dept: SURGERY | Age: 72
End: 2022-05-19

## 2022-05-19 DIAGNOSIS — C50.912 MALIGNANT NEOPLASM OF LEFT FEMALE BREAST, UNSPECIFIED ESTROGEN RECEPTOR STATUS, UNSPECIFIED SITE OF BREAST (HCC): Primary | ICD-10-CM

## 2022-05-19 NOTE — PROGRESS NOTES
[facility]  Breast Cancer Education    Patient: Kathy Waldron  YOB: 1950    HPI: Kathy Waldron is a 67 y.o. female who presents today for breast cancer education. She was found to have left breast cancer and desires to proceed with unilateral mastectomy with reconstruction. Greater than 120 minutes was spent face to face with patient discussing preoperative and postoperative expectations with CNP, MD and RN, including wound care, surgical bra, drain care, medications, nutrition intake of increased protein and activity restrictions. Surgical options including risks and benefits was discussed at length with MD and EDWARD. Family member, Jason Mathews, also joined the conversation via [place]. Chief Complaint   Patient presents with    Consultation     Breast cancer education       Plan: Patient wishes to proceed, at this time, with left breast reconstruction with stage 1 tissue expander placement with Alloderm.     Karthik Abdi, KULWINDERN, RN [address] [place] [address] 177 and Reconstructive Surgery  [phone]

## 2022-06-02 RX ORDER — LEVOTHYROXINE SODIUM 0.07 MG/1
75 TABLET ORAL DAILY
COMMUNITY

## 2022-06-02 NOTE — PROGRESS NOTES
Place patient label inside box (if no patient label, complete below)  Name:  :  MR#:   Edilma Tavarez / PROCEDURE  1. I (we), Jaden Jurado (Patient Name) authorize Gayatri Kulkarni MD (Provider / Lisa Romero) and/or such assistants as may be selected by him/her, to perform the following operation/procedure(s): MASTECTOMY, NIPPLE SPARING LATERALITY, LEFT BREAST        Note: If unable to obtain consent prior to an emergent procedure, document the emergent reason in the medical record. This procedure has been explained to my (our) satisfaction and included in the explanation was:  A) The intended benefit, nature, and extent of the procedure to be performed;  B) The significant risks involved and the probability of success;  C) Alternative procedures and methods of treatment;  D) The dangers and probable consequences of such alternatives (including no procedure or treatment); E) The expected consequences of the procedure on my future health;  F) Whether other qualified individuals would be performing important surgical tasks and/or whether  would be present to advise or support the procedure. I (we) understand that there are other risks of infection and other serious complications in the pre-operative/procedural and postoperative/procedural stages of my (our) care. I (we) have asked all of the questions which I (we) thought were important in deciding whether or not to undergo treatment or diagnosis. These questions have been answered to my (our) satisfaction. I (we) understand that no assurance can be given that the procedure will be a success, and no guarantee or warranty of success has been given to me (us).     2. It has been explained to me (us) that during the course of the operation/procedure, unforeseen conditions may be revealed that necessitate extension of the original procedure(s) or different procedure(s) than those set forth in Paragraph 1. I (we) authorize and request that the above-named physician, his/her assistants or his/her designees, perform procedures as necessary and desirable if deemed to be in my (our) best interest.     Revised 8/2/2021                                                                          Page 1 of 2       3. I acknowledge that health care personnel may be observing this procedure for the purpose of medical education or other specified purposes as may be necessary as requested and/or approved by my (our) physician. 4. I (we) consent to the disposal by the hospital Pathologist of the removed tissue, parts or organs in accordance with hospital policy. 5. I do ____ do not ____ consent to the use of a local infiltration pain blocking agent that will be used by my provider/surgical provider to help alleviate pain during my procedure. 6. I do ____ do not ____ consent to an emergent blood transfusion in the case of a life-threatening situation that requires blood components to be administered. This consent is valid for 24 hours from the beginning of the procedure. 7. This patient does ____ or does not ____ currently have a DNR status/order. If DNR order is in place, obtain Addendum to the Surgical Consent for ALL Patients with a DNR Order to address zion-operative status for limited intervention or DNR suspension.      8. I have read and fully understand the above Consent for Operation/Procedure and that all blanks were completed before I signed the consent.   _____________________________       _____________________      ____/____am/pm  Signature of Patient or legal representative      Printed Name / Relationship            Date / Time   ____________________________       _____________________      ____/____am/pm  Witness to Signature                                    Printed Name                    Date / Time     If patient is unable to sign or is a minor, complete the following)  Patient is a minor, ____ years of age, or unable to sign because:   ______________________________________________________________________________________________    [place] If a phone consent is obtained, consent will be documented by using two health care professionals, each affirming that the consenting party has no questions and gives consent for the procedure discussed with the physician/provider.   _____________________          ____________________       _____/_____am/pm   2nd witness to phone consent        Printed name           Date / Time    Informed Consent:  I have provided the explanation described above in section 1 to the patient and/or legal representative.  I have provided the patient and/or legal representative with an opportunity to ask any questions about the proposed operation/procedure.   ___________________________          ____________________         ____/____am/pm  Provider / Proceduralist                            Printed name            Date / Time  Revised 8/2/2021                                                                      Page 2 of 2

## 2022-06-02 NOTE — PROGRESS NOTES
different procedure(s) than those set forth in Paragraph 1. I (we) authorize and request that the above-named physician, his/her assistants or his/her designees, perform procedures as necessary and desirable if deemed to be in my (our) best interest.     Revised 8/2/2021                                                                          Page 1 of 2       3. I acknowledge that health care personnel may be observing this procedure for the purpose of medical education or other specified purposes as may be necessary as requested and/or approved by my (our) physician. 4. I (we) consent to the disposal by the hospital Pathologist of the removed tissue, parts or organs in accordance with hospital policy. 5. I do ____ do not ____ consent to the use of a local infiltration pain blocking agent that will be used by my provider/surgical provider to help alleviate pain during my procedure. 6. I do ____ do not ____ consent to an emergent blood transfusion in the case of a life-threatening situation that requires blood components to be administered. This consent is valid for 24 hours from the beginning of the procedure. 7. This patient does ____ or does not ____ currently have a DNR status/order. If DNR order is in place, obtain Addendum to the Surgical Consent for ALL Patients with a DNR Order to address zion-operative status for limited intervention or DNR suspension.      8. I have read and fully understand the above Consent for Operation/Procedure and that all blanks were completed before I signed the consent.   _____________________________       _____________________      ____/____am/pm  Signature of Patient or legal representative      Printed Name / Relationship            Date / Time   ____________________________       _____________________      ____/____am/pm  Witness to Signature                                    Printed Name                    Date / Time     If patient is unable to sign or is a minor, complete the following)  Patient is a minor, ____ years of age, or unable to sign because:   ______________________________________________________________________________________________    Everlene Jimenez If a phone consent is obtained, consent will be documented by using two health care professionals, each affirming that the consenting party has no questions and gives consent for the procedure discussed with the physician/provider.   _____________________          ____________________       _____/_____am/pm   2nd witness to phone consent        Printed name           Date / Time    Informed Consent:  I have provided the explanation described above in section 1 to the patient and/or legal representative.  I have provided the patient and/or legal representative with an opportunity to ask any questions about the proposed operation/procedure.   ___________________________          ____________________         ____/____am/pm  Provider / Proceduralist                            Printed name            Date / Time  Revised 8/2/2021                                                                      Page 2 of 2

## 2022-06-02 NOTE — PROGRESS NOTES
[facility] HISTORY; PATIENT HAD TO GO;; SHE WAS OK WITH ME CALLING BACK AND LEAVING INSTRUCTIONS ON VOICEMAIL. PRIOR TO PROCEDURE DATE:        1. PLEASE FOLLOW ANY  GUIDELINES/ INSTRUCTIONS PRIOR TO YOUR PROCEDURE AS ADVISED BY YOUR SURGEON. 2. Arrange for someone to drive you home and be with you for the first 24 hours after discharge for your safety after your procedure for which you received sedation. Ensure it is someone we can share information with regarding your discharge. 3. You must contact your surgeon for instructions IF:   You are taking any blood thinners, aspirin, anti-inflammatory or vitamin E.   There is a change in your physical condition such as a cold, fever, rash, cuts, sores or any other infection, especially near your surgical site. 4. Do not drink alcohol the day before or day of your procedure. 5. A Pre-op History and Physical for surgery MUST be completed by your Physician or Urgent Care within 30 days of your procedure date. Please bring a copy with you on the day of your procedure and along with any other testing performed. THE DAY OF YOUR PROCEDURE:  1. Follow instructions for ARRIVAL TIME as DIRECTED BY YOUR SURGEON. 2. Enter the MAIN entrance from [facility] [employer] [facility] and follow the signs to the free [employer] or [email or web address] parking (offered free of charge 6am-5pm). 3. Enter the Main Entrance of the hospital (do not enter from the lower level of the parking garage). Upon entrance, check in with the  at the main desk on your left. If no one is available at the desk, proceed into the [facility] Waiting Room and go through the door directly into the [facility]. There is a Check-in desk ACROSS from Room 5 (marked with a sign hanging from the ceiling). The phone number for the surgery center is [phone].     4. Please call [phone] option #2 option #2 if you have not been preregistered yet. On the day of your procedure bring your insurance card and photo ID. You will be registered at your bedside once brought back to your room. 5. DO NOT EAT ANYTHING eight hours prior to your arrival for surgery. May have 8 ounces of water 4 hours prior to your arrival for surgery. NOTE: ALL Gastric, Bariatric and Bowel surgery patients MUST follow their surgeon's instructions. FOLLOW WHATEVER YOUR SURGEON HAS TOLD YOU REGARDING ANY PREOP, WHEN TO STOP EATING, DRINKING AND ANY SPECIAL DIET. 6. MEDICATIONS    Take the following medications with a SMALL sip of water: AMLODIPINE, ANASTROZOLE, LEVOTHYROXINE, EFFEXOR BRING CPAP MACHINE   Bariatric patient's call surgeon if on diabetic medications as some need to be stopped 1 week preop   Use your usual dose of inhalers the morning of surgery. BRING your rescue inhaler with you to hospital.    Anesthesia does NOT want you to take insulin the morning of surgery. They will control your blood sugar while you are at the hospital. Please contact your ordering physician for instructions regarding your insulin the night before your procedure. If you have an insulin pump, please keep it set on basal rate. 7. Do not swallow water when brushing teeth. No gum, candy, mints or ice chips. Refrain from smoking or at least decrease the amount. 8. Dress in loose, comfortable clothing appropriate for redressing after your procedure. Do not wear jewelry (including body piercings), make-up (especially NO eye make-up), fingernail polish (NO toenail polish if foot/leg surgery), lotion, powders or metal hairclips. 9. Dentures, glasses, or contacts will need to be removed before your procedure. Bring cases for your glasses, contacts, dentures, or hearing aids to protect them while you are in surgery. 10. If you use a CPAP, please bring it with you on the day of your procedure.     11. We recommend that valuable personal  belongings such as cash, cell phones, e-tablets or jewelry, be left at home during your stay. The hospital will not be responsible for valuables that are not secured in the hospital safe. However, if your insurance requires a co-pay, you may want to bring a method of payment, i.e. Check or credit card, if you wish to pay your co-pay the day of surgery. 12. If you are to stay overnight, you may bring a bag with personal items. Please have any large items you may need brought in by your family after your arrival to your hospital room. 15. If you have a Living Will or Durable Power of , please bring a copy on the day of your procedure. 15. With your permission, one family member may accompany you while you are being prepared for surgery. Once you are ready, additional family members may join you. HOW WE KEEP YOU SAFE and WORK TO PREVENT SURGICAL SITE INFECTIONS:  1. Health care workers should always check your ID bracelet to verify your name and birth date. You will be asked many times to state your name, date of birth, and allergies. 2. Health care workers should always clean their hands with soap or alcohol gel before providing care to you. It is okay to ask anyone if they cleaned their hands before they touch you. 3. You will be actively involved in verifying the type of procedure you are having and ensuring the correct surgical site. This will be confirmed multiple times prior to your procedure. Do NOT prasad your surgery site UNLESS instructed to by your surgeon. 4. Do not shave or wax for 72 hours prior to procedure near your operative site. Shaving with a razor can irritate your skin and make it easier to develop an infection. On the day of your procedure, any hair that needs to be removed near the surgical site will be clipped by a healthcare worker using a special clippers designed to avoid skin irritation.     5. When you are in the operating room, your surgical site will be cleansed with a special soap, and in most cases, you will be given an antibiotic before the surgery begins. What to expect AFTER YOUR PROCEDURE:  1. Immediately following your procedure, your will be taken to the PACU for the first phase of your recovery. Your nurse will help you recover from any potential side effects of anesthesia, such as extreme drowsiness, changes in your vital signs or breathing patterns. Nausea, headache, muscle aches, or sore throat may also occur after anesthesia. Your nurse will help you manage these potential side effects. 2. For comfort and safety, arrange to have someone at home with you for the first 24 hours after discharge. 3. You and your family will be given written instructions about your diet, activity, dressing care, medications, and return visits. 4. Once at home, should issues with nausea, pain, or bleeding occur, or should you notice any signs of infection, you should call your surgeon. 5. Always clean your hands before and after caring for your wound. Do not let your family touch your surgery site without cleaning their hands. 6. Narcotic pain medications can cause significant constipation. You may want to add a stool softener to your postoperative medication schedule or speak to your surgeon on how best to manage this SIDE EFFECT. SPECIAL INSTRUCTIONS BRING CPAP MACHINE; PATIENT ACKNOWLEDGES UNDERSTANDING OF THIS. Thank you for allowing us to care for you. We strive to exceed your expectations in the delivery of care and service provided to you and your family. If you need to contact the [place] [address] staff for any reason, please call us at [phone]    Instructions reviewed with patient during preadmission testing phone interview.   Tamir Pope RN.6/2/2022 .3:33 PM      ADDITIONAL EDUCATIONAL INFORMATION REVIEWED PER PHONE WITH YOU AND/OR YOUR FAMILY:  No Hibiclens® Bathing Instructions   Yes Antibacterial Soap

## 2022-06-06 ENCOUNTER — ANESTHESIA EVENT (OUTPATIENT)
Dept: OPERATING ROOM | Age: 72
End: 2022-06-06
Payer: MEDICARE

## 2022-06-07 ENCOUNTER — ANESTHESIA (OUTPATIENT)
Dept: OPERATING ROOM | Age: 72
End: 2022-06-07
Payer: MEDICARE

## 2022-06-07 ENCOUNTER — HOSPITAL ENCOUNTER (OUTPATIENT)
Age: 72
Setting detail: OUTPATIENT SURGERY
Discharge: HOME | End: 2022-06-07
Attending: SURGERY | Admitting: SURGERY
Payer: MEDICARE

## 2022-06-07 VITALS
BODY MASS INDEX: 26.72 KG/M2 | RESPIRATION RATE: 16 BRPM | HEART RATE: 73 BPM | OXYGEN SATURATION: 93 % | SYSTOLIC BLOOD PRESSURE: 118 MMHG | HEIGHT: 63 IN | TEMPERATURE: 97.3 F | WEIGHT: 150.8 LBS | DIASTOLIC BLOOD PRESSURE: 67 MMHG

## 2022-06-07 DIAGNOSIS — C50.412 MALIGNANT NEOPLASM OF UPPER-OUTER QUADRANT OF LEFT BREAST IN FEMALE, ESTROGEN RECEPTOR POSITIVE (HCC): ICD-10-CM

## 2022-06-07 DIAGNOSIS — G89.18 ACUTE POST-OPERATIVE PAIN: Primary | ICD-10-CM

## 2022-06-07 DIAGNOSIS — C50.412 MALIGNANT NEOPLASM OF UPPER-OUTER QUADRANT OF LEFT FEMALE BREAST, UNSPECIFIED ESTROGEN RECEPTOR STATUS (HCC): ICD-10-CM

## 2022-06-07 DIAGNOSIS — Z17.0 MALIGNANT NEOPLASM OF UPPER-OUTER QUADRANT OF LEFT BREAST IN FEMALE, ESTROGEN RECEPTOR POSITIVE (HCC): ICD-10-CM

## 2022-06-07 DIAGNOSIS — C50.912 MALIGNANT NEOPLASM OF LEFT FEMALE BREAST, UNSPECIFIED ESTROGEN RECEPTOR STATUS, UNSPECIFIED SITE OF BREAST (HCC): ICD-10-CM

## 2022-06-07 LAB
APTT: 27.9 SEC (ref 23–34.3)
INR BLD: 1 (ref 0.87–1.14)
PROTHROMBIN TIME: 13.1 SEC (ref 11.7–14.5)

## 2022-06-07 PROCEDURE — 6360000002 HC RX W HCPCS: Performed by: NURSE ANESTHETIST, CERTIFIED REGISTERED

## 2022-06-07 PROCEDURE — 85730 THROMBOPLASTIN TIME PARTIAL: CPT

## 2022-06-07 PROCEDURE — 2709999900 HC NON-CHARGEABLE SUPPLY: Performed by: SURGERY

## 2022-06-07 PROCEDURE — 15777 ACELLULAR DERM MATRIX IMPLT: CPT | Performed by: SURGERY

## 2022-06-07 PROCEDURE — C1789 PROSTHESIS, BREAST, IMP: HCPCS | Performed by: SURGERY

## 2022-06-07 PROCEDURE — 7100000011 HC PHASE II RECOVERY - ADDTL 15 MIN: Performed by: SURGERY

## 2022-06-07 PROCEDURE — 88342 IMHCHEM/IMCYTCHM 1ST ANTB: CPT

## 2022-06-07 PROCEDURE — 2500000003 HC RX 250 WO HCPCS: Performed by: NURSE ANESTHETIST, CERTIFIED REGISTERED

## 2022-06-07 PROCEDURE — 85610 PROTHROMBIN TIME: CPT

## 2022-06-07 PROCEDURE — 2500000003 HC RX 250 WO HCPCS: Performed by: SURGERY

## 2022-06-07 PROCEDURE — 2580000003 HC RX 258: Performed by: SURGERY

## 2022-06-07 PROCEDURE — 7100000010 HC PHASE II RECOVERY - FIRST 15 MIN: Performed by: SURGERY

## 2022-06-07 PROCEDURE — 3700000001 HC ADD 15 MINUTES (ANESTHESIA): Performed by: SURGERY

## 2022-06-07 PROCEDURE — 6360000002 HC RX W HCPCS: Performed by: SURGERY

## 2022-06-07 PROCEDURE — 3700000000 HC ANESTHESIA ATTENDED CARE: Performed by: SURGERY

## 2022-06-07 PROCEDURE — 3600000004 HC SURGERY LEVEL 4 BASE: Performed by: SURGERY

## 2022-06-07 PROCEDURE — 19357 TISS XPNDR PLMT BRST RCNSTJ: CPT | Performed by: SURGERY

## 2022-06-07 PROCEDURE — C1729 CATH, DRAINAGE: HCPCS | Performed by: SURGERY

## 2022-06-07 PROCEDURE — 7100000001 HC PACU RECOVERY - ADDTL 15 MIN: Performed by: SURGERY

## 2022-06-07 PROCEDURE — 88341 IMHCHEM/IMCYTCHM EA ADD ANTB: CPT

## 2022-06-07 PROCEDURE — L8000 MASTECTOMY BRA: HCPCS | Performed by: SURGERY

## 2022-06-07 PROCEDURE — A4217 STERILE WATER/SALINE, 500 ML: HCPCS | Performed by: SURGERY

## 2022-06-07 PROCEDURE — 2720000010 HC SURG SUPPLY STERILE: Performed by: SURGERY

## 2022-06-07 PROCEDURE — C9290 INJ, BUPIVACAINE LIPOSOME: HCPCS | Performed by: SURGERY

## 2022-06-07 PROCEDURE — 7100000000 HC PACU RECOVERY - FIRST 15 MIN: Performed by: SURGERY

## 2022-06-07 PROCEDURE — 3600000014 HC SURGERY LEVEL 4 ADDTL 15MIN: Performed by: SURGERY

## 2022-06-07 PROCEDURE — 88307 TISSUE EXAM BY PATHOLOGIST: CPT

## 2022-06-07 PROCEDURE — 15860 IV NJX TST VASC FLO FLAP/GRF: CPT | Performed by: SURGERY

## 2022-06-07 PROCEDURE — 2580000003 HC RX 258: Performed by: ANESTHESIOLOGY

## 2022-06-07 DEVICE — GRAFT HUM TISS THK2-2.8MM THCK L PERF CNTOUR ACELLULAR DERM: Type: IMPLANTABLE DEVICE | Site: BREAST | Status: FUNCTIONAL

## 2022-06-07 RX ORDER — ONDANSETRON 2 MG/ML
INJECTION INTRAMUSCULAR; INTRAVENOUS PRN
Status: DISCONTINUED | OUTPATIENT
Start: 2022-06-07 | End: 2022-06-07 | Stop reason: SDUPTHER

## 2022-06-07 RX ORDER — ACETAMINOPHEN 500 MG
1000 TABLET ORAL EVERY 6 HOURS PRN
COMMUNITY

## 2022-06-07 RX ORDER — SODIUM CHLORIDE 0.9 % (FLUSH) 0.9 %
5-40 SYRINGE (ML) INJECTION EVERY 12 HOURS SCHEDULED
Status: DISCONTINUED | OUTPATIENT
Start: 2022-06-07 | End: 2022-06-07 | Stop reason: HOSPADM

## 2022-06-07 RX ORDER — SODIUM CHLORIDE 9 MG/ML
INJECTION, SOLUTION INTRAVENOUS PRN
Status: DISCONTINUED | OUTPATIENT
Start: 2022-06-07 | End: 2022-06-07 | Stop reason: HOSPADM

## 2022-06-07 RX ORDER — ROCURONIUM BROMIDE 10 MG/ML
INJECTION, SOLUTION INTRAVENOUS PRN
Status: DISCONTINUED | OUTPATIENT
Start: 2022-06-07 | End: 2022-06-07 | Stop reason: SDUPTHER

## 2022-06-07 RX ORDER — HYDROMORPHONE HCL 110MG/55ML
PATIENT CONTROLLED ANALGESIA SYRINGE INTRAVENOUS PRN
Status: DISCONTINUED | OUTPATIENT
Start: 2022-06-07 | End: 2022-06-07 | Stop reason: SDUPTHER

## 2022-06-07 RX ORDER — IBUPROFEN 200 MG
600 TABLET ORAL EVERY 6 HOURS PRN
Status: ON HOLD | COMMUNITY
End: 2022-06-07 | Stop reason: HOSPADM

## 2022-06-07 RX ORDER — SUCCINYLCHOLINE CHLORIDE 20 MG/ML
INJECTION INTRAMUSCULAR; INTRAVENOUS PRN
Status: DISCONTINUED | OUTPATIENT
Start: 2022-06-07 | End: 2022-06-07 | Stop reason: SDUPTHER

## 2022-06-07 RX ORDER — SODIUM CHLORIDE 0.9 % (FLUSH) 0.9 %
5-40 SYRINGE (ML) INJECTION PRN
Status: DISCONTINUED | OUTPATIENT
Start: 2022-06-07 | End: 2022-06-07 | Stop reason: HOSPADM

## 2022-06-07 RX ORDER — LABETALOL HYDROCHLORIDE 5 MG/ML
10 INJECTION, SOLUTION INTRAVENOUS
Status: DISCONTINUED | OUTPATIENT
Start: 2022-06-07 | End: 2022-06-07 | Stop reason: HOSPADM

## 2022-06-07 RX ORDER — PROPOFOL 10 MG/ML
INJECTION, EMULSION INTRAVENOUS PRN
Status: DISCONTINUED | OUTPATIENT
Start: 2022-06-07 | End: 2022-06-07 | Stop reason: SDUPTHER

## 2022-06-07 RX ORDER — FENTANYL CITRATE 50 UG/ML
25 INJECTION, SOLUTION INTRAMUSCULAR; INTRAVENOUS EVERY 5 MIN PRN
Status: DISCONTINUED | OUTPATIENT
Start: 2022-06-07 | End: 2022-06-07 | Stop reason: HOSPADM

## 2022-06-07 RX ORDER — HYDROCODONE BITARTRATE AND ACETAMINOPHEN 5; 325 MG/1; MG/1
1 TABLET ORAL EVERY 6 HOURS PRN
Qty: 20 TABLET | Refills: 0 | Status: SHIPPED | OUTPATIENT
Start: 2022-06-07 | End: 2022-06-12

## 2022-06-07 RX ORDER — HYDRALAZINE HYDROCHLORIDE 20 MG/ML
10 INJECTION INTRAMUSCULAR; INTRAVENOUS
Status: DISCONTINUED | OUTPATIENT
Start: 2022-06-07 | End: 2022-06-07 | Stop reason: HOSPADM

## 2022-06-07 RX ORDER — SODIUM CHLORIDE, SODIUM LACTATE, POTASSIUM CHLORIDE, CALCIUM CHLORIDE 600; 310; 30; 20 MG/100ML; MG/100ML; MG/100ML; MG/100ML
INJECTION, SOLUTION INTRAVENOUS CONTINUOUS
Status: DISCONTINUED | OUTPATIENT
Start: 2022-06-07 | End: 2022-06-07 | Stop reason: HOSPADM

## 2022-06-07 RX ORDER — GLYCOPYRROLATE 0.2 MG/ML
INJECTION INTRAMUSCULAR; INTRAVENOUS PRN
Status: DISCONTINUED | OUTPATIENT
Start: 2022-06-07 | End: 2022-06-07 | Stop reason: SDUPTHER

## 2022-06-07 RX ORDER — LIDOCAINE HYDROCHLORIDE 20 MG/ML
INJECTION, SOLUTION INFILTRATION; PERINEURAL PRN
Status: DISCONTINUED | OUTPATIENT
Start: 2022-06-07 | End: 2022-06-07 | Stop reason: SDUPTHER

## 2022-06-07 RX ORDER — FENTANYL CITRATE 50 UG/ML
INJECTION, SOLUTION INTRAMUSCULAR; INTRAVENOUS PRN
Status: DISCONTINUED | OUTPATIENT
Start: 2022-06-07 | End: 2022-06-07 | Stop reason: SDUPTHER

## 2022-06-07 RX ORDER — ONDANSETRON 2 MG/ML
4 INJECTION INTRAMUSCULAR; INTRAVENOUS
Status: DISCONTINUED | OUTPATIENT
Start: 2022-06-07 | End: 2022-06-07 | Stop reason: HOSPADM

## 2022-06-07 RX ORDER — PROCHLORPERAZINE EDISYLATE 5 MG/ML
5 INJECTION INTRAMUSCULAR; INTRAVENOUS
Status: DISCONTINUED | OUTPATIENT
Start: 2022-06-07 | End: 2022-06-07 | Stop reason: HOSPADM

## 2022-06-07 RX ADMIN — GLYCOPYRROLATE 0.2 MG: 0.2 INJECTION INTRAMUSCULAR; INTRAVENOUS at 07:32

## 2022-06-07 RX ADMIN — SUGAMMADEX 100 MG: 100 INJECTION, SOLUTION INTRAVENOUS at 10:14

## 2022-06-07 RX ADMIN — TRANEXAMIC ACID 1000 MG: 1 INJECTION, SOLUTION INTRAVENOUS at 07:47

## 2022-06-07 RX ADMIN — ONDANSETRON 4 MG: 2 INJECTION INTRAMUSCULAR; INTRAVENOUS at 07:57

## 2022-06-07 RX ADMIN — CEFAZOLIN 2000 MG: 2 INJECTION, POWDER, FOR SOLUTION INTRAMUSCULAR; INTRAVENOUS at 07:28

## 2022-06-07 RX ADMIN — SODIUM CHLORIDE, POTASSIUM CHLORIDE, SODIUM LACTATE AND CALCIUM CHLORIDE: 600; 310; 30; 20 INJECTION, SOLUTION INTRAVENOUS at 06:51

## 2022-06-07 RX ADMIN — PHENYLEPHRINE HYDROCHLORIDE 100 MCG: 10 INJECTION, SOLUTION INTRAMUSCULAR; INTRAVENOUS; SUBCUTANEOUS at 08:26

## 2022-06-07 RX ADMIN — ROCURONIUM BROMIDE 40 MG: 10 INJECTION INTRAVENOUS at 07:51

## 2022-06-07 RX ADMIN — FENTANYL CITRATE 50 MCG: 50 INJECTION, SOLUTION INTRAMUSCULAR; INTRAVENOUS at 07:34

## 2022-06-07 RX ADMIN — PHENYLEPHRINE HYDROCHLORIDE 100 MCG: 10 INJECTION, SOLUTION INTRAMUSCULAR; INTRAVENOUS; SUBCUTANEOUS at 07:40

## 2022-06-07 RX ADMIN — LIDOCAINE HYDROCHLORIDE 100 MG: 20 INJECTION, SOLUTION INFILTRATION; PERINEURAL at 07:34

## 2022-06-07 RX ADMIN — PHENYLEPHRINE HYDROCHLORIDE 100 MCG: 10 INJECTION, SOLUTION INTRAMUSCULAR; INTRAVENOUS; SUBCUTANEOUS at 07:44

## 2022-06-07 RX ADMIN — ONDANSETRON 4 MG: 2 INJECTION INTRAMUSCULAR; INTRAVENOUS at 10:12

## 2022-06-07 RX ADMIN — PROPOFOL 200 MG: 10 INJECTION, EMULSION INTRAVENOUS at 07:34

## 2022-06-07 RX ADMIN — HYDROMORPHONE HYDROCHLORIDE 1 MG: 2 INJECTION, SOLUTION INTRAMUSCULAR; INTRAVENOUS; SUBCUTANEOUS at 08:01

## 2022-06-07 RX ADMIN — FENTANYL CITRATE 50 MCG: 50 INJECTION, SOLUTION INTRAMUSCULAR; INTRAVENOUS at 07:53

## 2022-06-07 RX ADMIN — SUCCINYLCHOLINE CHLORIDE 100 MG: 20 INJECTION, SOLUTION INTRAMUSCULAR; INTRAVENOUS; PARENTERAL at 07:35

## 2022-06-07 RX ADMIN — ROCURONIUM BROMIDE 10 MG: 10 INJECTION INTRAVENOUS at 07:34

## 2022-06-07 ASSESSMENT — LIFESTYLE VARIABLES: SMOKING_STATUS: 0

## 2022-06-07 ASSESSMENT — ENCOUNTER SYMPTOMS: SHORTNESS OF BREATH: 0

## 2022-06-07 ASSESSMENT — PAIN DESCRIPTION - LOCATION: LOCATION: BREAST

## 2022-06-07 ASSESSMENT — PAIN DESCRIPTION - ORIENTATION: ORIENTATION: LEFT

## 2022-06-07 ASSESSMENT — PAIN - FUNCTIONAL ASSESSMENT: PAIN_FUNCTIONAL_ASSESSMENT: 0-10

## 2022-06-07 ASSESSMENT — PAIN SCALES - GENERAL: PAINLEVEL_OUTOF10: 1

## 2022-06-07 NOTE — PROGRESS NOTES
Patient admitted to PACU # 18 from OR at 1033 post MASTECTOMY, NIPPLE SPARING LATERALITY, LEFT BREAST - Left and LEFT BREAST RECONSTRUCTION WITH STAGE 1 TISSUE EXPANDER PLACEMENT WITH ALLODERM - Left General   per Dr. Lucy Florez, Dr. Amanda Day, and  [facility]. Attached to PACU monitoring system and report received from anesthesia provider. Patient arrived to PACU with oral airway in place. Pt on nonrebreather mask at 15 L. Pt surgical bra c/d/i. Pt has NAYAN to L with serosanguineous drainage. Dr. Amira Dalal came to bedside. Will continue to monitor.

## 2022-06-07 NOTE — BRIEF OP NOTE
Brief Postoperative Note      Patient: Debby Barajas  YOB: 1950  MRN: [id number]    Date of Procedure: 6/7/2022    Pre-Op Diagnosis: Malignant neoplasm of left female breast, unspecified estrogen receptor status, unspecified site of breast ([facility] [address] [C50.912] Malignant neoplasm of upper-outer quadrant of left female breast, unspecified estrogen receptor status ([facility] [address] [C50.412]    Post-Op Diagnosis: Same       Procedure(s):  MASTECTOMY, NIPPLE SPARING LATERALITY, LEFT BREAST  LEFT BREAST RECONSTRUCTION WITH STAGE 1 TISSUE EXPANDER PLACEMENT WITH ALLODERM    Surgeon(s):  DO Jennifer Schumacher MD Corie Lacy, MD    Assistant:  Surgical Assistant: Oralia Bruce RN    Anesthesia: General    Estimated Blood Loss (mL): less than 50     Complications: None    Specimens:   ID Type Source Tests Collected by Time Destination   A : A) LEFT BREAST Tissue Tissue [address] [place], MD 6/7/2022 9181        Implants:  Implant Name Type Inv. Item Serial No.  Lot No. LRB No. Used Action   GRAFT HUM TISS THK2-2.8MM THCK L PERF CNTOUR ACELLULAR DERM - [id number]  GRAFT HUM TISS THK2-2.8MM [facility]-PATTI L PERF [facility]- [id number] Left 1 Implanted   GRAFT HUM TISS THK2-2.8MM [facility]-PATTI L PERF CNTOUR ACELLULAR DERM - [id number]  GRAFT HUM TISS THK2-2.8MM [facility]- [id number] Left 1 Implanted         Drains:   Closed/Suction Drain Inferior; Left Breast Bulb (Active)   Site Description Clean, dry & intact 06/07/22 1143   Dressing Status Clean, dry & intact 06/07/22 1143   Drainage Appearance Serosanguinous 06/07/22 1143   Drain Status Compressed 06/07/22 1143       Findings: lumpectomy site with seroma    Electronically signed by Lindsey Birmingham MD on 6/7/2022 at 11:53 AM

## 2022-06-07 NOTE — OP NOTE
Operative Note      Patient: Lakeshia King  YOB: 1950  MRN: [id number]    Date of Procedure: 6/7/2022    Pre-Op Diagnosis: Malignant neoplasm of left female breast, unspecified estrogen receptor status, unspecified site of breast ([facility] [address] [C50.912] Malignant neoplasm of upper-outer quadrant of left female breast, unspecified estrogen receptor status ([facility] [address] [C50.412]    Post-Op Diagnosis: Same       Procedure(s):  MASTECTOMY, NIPPLE SPARING LATERALITY, LEFT BREAST  LEFT BREAST RECONSTRUCTION WITH STAGE 1 TISSUE EXPANDER PLACEMENT WITH ALLODERM    Surgeon(s):  DO Hue Allen MD Edrie Hercules, MD    Assistant:   Surgical Assistant: Radha Reza RN    Anesthesia: General    Estimated Blood Loss (mL): less than 50     Complications: None    Specimens:   ID Type Source Tests Collected by Time Destination   A : A) LEFT BREAST Tissue Tissue [address] [place], MD 6/7/2022 9838        Implants:  Implant Name Type Inv. Item Serial No.  Lot No. LRB No. Used Action   GRAFT HUM TISS THK2-2.8MM THCK L PERF CNTOUR ACELLULAR DERM - [id number]  GRAFT HUM TISS THK2-2.8MM [facility]-PATTI L PERF Yange Brooks  [employer]- [id number] Left 1 Implanted   GRAFT HUM TISS THK2-2.8MM [facility]-PATTI L PERF CNTOUR ACELLULAR DERM - [id number]  GRAFT HUM TISS THK2-2.8MM [facility]  [employer] [id number] Left 1 Implanted         Drains:   Closed/Suction Drain Inferior; Left Breast Bulb (Active)   Site Description Clean, dry & intact 06/07/22 1510   Dressing Status Clean, dry & intact 06/07/22 1510   Drainage Appearance Serosanguinous 06/07/22 1510   Drain Status To bulb suction 06/07/22 1510   Output (ml) 40 ml 06/07/22 1510       Findings: Lumpectomy site with seroma    Indications: The patient is a 66-year-old woman who had previously undergone left lumpectomy with axillary dissection.   At the time of initial lumpectomy her superior and inferior margins were positive. She underwent reexcision and still has persistently positive inferior margin. We we discussed her options and she elected to proceed with completion mastectomy. She was seen in consultation by Dr. Boni Figueroa regarding her reconstruction options. Detailed Description of Procedure: The patient was taken to the operating room placed in supine position. After induction of general anesthesia, the left breast and axilla were prepped and draped in the usual sterile fashion. We created a inframammary incision as indicated by Dr. Judie Buckner and extended the incision through the subcutaneous tissues. We proceeded with elevation of the breast parenchyma and pectoralis fascia off the pectoralis muscle to the level of the sternum,clavicle and latissimus dorsi. We then proceeded with development of mastectomy flaps using the PlasmaBlade. The flaps were elevated in a radial fashion. Once we completed the dissection, the specimen was oriented using a margin map weight and submitted in formalin for permanent section. We inspected the site for hemostasis. Bleeding points were controlled using electrocautery. We irrigated the surgical site. Care of the patient was then transferred to Dr. Boni Figueroa for placement of tissue expanders in first stage reconstruction. Estimated blood loss was less than 50 mL. The second part of the operation is dictated under separate cover by Dr. Judie Buckner. Dr. Orion Maria was present throughout the entire procedure and assisted with the procedure.     Electronically signed by Yasmin Nash MD on 6/7/2022 at 4:25 PM

## 2022-06-07 NOTE — H&P
The patient was identified and examined. The surgical site was marked by Dr Kim Melo  Interval changes in health status since H&P was performed: bilateral pedal edema. The patient is cleared to proceed as scheduled.

## 2022-06-07 NOTE — ANESTHESIA PRE PROCEDURE
Department of Anesthesiology  Preprocedure Note       Name:  Terrie Arroyo   Age:  67 y.o.  :  1950                                          MRN:  [id number]         Date:  2022      Surgeon: Tyra Muñoz):  MD Avinash Astorga MD    Procedure: Procedure(s):  MASTECTOMY, NIPPLE SPARING LATERALITY, LEFT BREAST  LEFT BREAST RECONSTRUCTION WITH STAGE 1 TISSUE EXPANDER PLACEMENT WITH ALLODERM    Medications prior to admission:   Prior to Admission medications    Medication Sig Start Date End Date Taking? Authorizing Provider   acetaminophen (TYLENOL) 500 MG tablet Take 1,000 mg by mouth every 6 hours as needed for Pain    Historical Provider, MD   ibuprofen (ADVIL;MOTRIN) 200 MG tablet Take 600 mg by mouth every 6 hours as needed for Pain    Historical Provider, MD   levothyroxine (SYNTHROID) 75 MCG tablet Take 75 mcg by mouth Daily ALTERNATES [address]    Historical Provider, MD   anastrozole (ARIMIDEX) 1 MG tablet Take 1 mg by mouth 21   Historical Provider, MD   Venlafaxine HCl (EFFEXOR XR PO) Take 187.5 mg by mouth daily    Historical Provider, MD   atomoxetine (STRATTERA) 40 MG capsule Take 40 mg by mouth daily    Historical Provider, MD   amLODIPine (NORVASC) 10 MG tablet Take 10 mg by mouth daily    Historical Provider, MD   levothyroxine (SYNTHROID) 50 MCG tablet Take 50 mcg by mouth Daily ALTERNATES 75[facility]G [address]    Historical Provider, MD   gabapentin (NEURONTIN) 100 MG capsule Take 200 mg by mouth nightly.  2 tabs     Historical Provider, MD   Cholecalciferol (VITAMIN D3) 50 MCG (2000) CAPS Take by mouth daily    Historical Provider, MD   calcium carbonate 600 MG TABS tablet Take 1 tablet by mouth daily    Historical Provider, MD   famotidine (PEPCID) 20 MG tablet Take 20 mg by mouth as needed    Historical Provider, MD   ZOCOR 20 MG tablet TAKE ONE TABLET BY MOUTH EVERY NIGHT AT BEDTIME 12/15/11   Marlen Haq MD PAROTIDECTOMY Left     PORT SURGERY Right 1/18/2022    RIGHT SUBCLAVIAN PORT PLACEMENT performed by Emre Jain MD at [address] LOC OF LEFT BREAST Left 12/9/2021    US GUIDED NEEDLE LOC OF LEFT BREAST 12/9/2021 [facility] ULTRAS[facility]    US GUIDED NEEDLE LOC OF LEFT BREAST Left 12/9/2021    US GUIDED NEEDLE LOC OF LEFT BREAST 12/9/2021 [facility] ULTRASOUND       Social History:    Social History     Tobacco Use    Smoking status: Never Smoker    Smokeless tobacco: Never Used   Substance Use Topics    Alcohol use: Yes     Comment: rarely                                Counseling given: Not Answered      Vital Signs (Current): There were no vitals filed for this visit. BP Readings from Last 3 Encounters:   06/07/22 128/81   05/11/22 107/88   01/18/22 137/66       NPO Status:                                                                                 BMI:   Wt Readings from Last 3 Encounters:   06/07/22 150 lb 12.8 oz (68.4 kg)   05/11/22 152 lb (68.9 kg)   01/18/22 140 lb (63.5 kg)     There is no height or weight on file to calculate BMI.    CBC: No results found for: WBC, RBC, HGB, HCT, MCV, RDW, PLT    CMP: No results found for: NA, K, CL, CO2, BUN, CREATININE, GFRAA, AGRATIO, LABGLOM, GLUCOSE, GLU, PROT, CALCIUM, BILITOT, ALKPHOS, AST, ALT    POC Tests: No results for input(s): POCGLU, POCNA, POCK, POCCL, POCBUN, POCHEMO, POCHCT in the last 72 hours.     Coags: No results found for: PROTIME, INR, APTT    HCG (If Applicable): No results found for: PREGTESTUR, PREGSERUM, HCG, HCGQUANT     ABGs: No results found for: PHART, PO2ART, EFB3ECT, ZVB2WXJ, BEART, [id number]     Type & Screen (If Applicable):  No results found for: LABABO, LABRH    Drug/Infectious Status (If Applicable):  No results found for: HIV, HEPCAB    COVID-19 Screening (If Applicable): No results found for: COVID19        Anesthesia Evaluation  Patient summary

## 2022-06-07 NOTE — PROGRESS NOTES
Ambulatory Surgery/Procedure Discharge Note    Vitals:    06/07/22 1515   BP: 118/67   Pulse:    Resp:    Temp:    SpO2:    See vital signs in flowsheet. Pt ready for discharge per Shahnaz score. In: 2190 [P.O.:390; I.V.:1800]  Out: 115 [Drains:85]    Restroom use offered before discharge. Yes. Pt voided before discharge. Pain assessment:    Pain Level: 1    Patient discharged to home/self care. Patient discharged via wheel chair by transporter to waiting family/S.O.       6/7/2022 3:45 PM Pt and S.O./family states \"ready to go home\". Pt alert and oriented x4. IV removed. Exparel armband in place. Denies N/V or pain. Dressing is clean and dry. NAYAN drain was emptied and dressing is dry. Voided prior to discharge. Discharge instructions given to pt and  with pt permission. Pt and wife verbalized understanding of all instructions. Left with all belongings, prescriptions sent to pharmacy, and discharge instructions.

## 2022-06-07 NOTE — H&P
Sera Carty    [id number]    [facility] Same Day Surgery Update H & P  Department of General Surgery   Surgical Service   Pre-operative History and Physical  Last H & P within the last 30 days. DIAGNOSIS:   Malignant neoplasm of left female breast, unspecified estrogen receptor status, unspecified site of breast ([facility] [C50.912]  Malignant neoplasm of upper-outer quadrant of left female breast, unspecified estrogen receptor status ([facility] [C50.412]    Procedure(s):  MASTECTOMY, NIPPLE SPARING LATERALITY, LEFT BREAST  LEFT BREAST RECONSTRUCTION WITH STAGE 1 TISSUE EXPANDER PLACEMENT WITH ALLODERM    History obtained from: Patient interview and EHR      HISTORY OF PRESENT ILLNESS:   The patient is a 67 y.o. female with right breast invasive lobular carcinoma. She is S/P right lumpectomy with re-excision. She presents today for the above procedures with Lynn Kim and Joycelyn Urbina. Illness Screening: Patient denies fever, chills, worsening cough, or close contact with sick individuals. Past Medical History:        Diagnosis Date    Breast cancer ([facility] 2004    right    Depression     Hyperlipidemia     Hypothyroid     Sleep apnea     wears CPAP     Past Surgical History:        Procedure Laterality Date    BREAST BIOPSY Left 11/2021    BREAST BIOPSY Left 12/14/2021    LEFT BREAST LUMPECTOMY, LEFT BREAST SENTINEL NODE BIOPSY (2:00)PM, COMPLETION AXILLARY DISSECTION, ULTRASOUND LATERALITY, LEFT BREAST 2 RADIOFREQUENCY TAGS, 1:00 5CM FTN AND 2:00 7CM FTN, TARGET MASS X 2 (12/9/2021) 10:30AM performed by Ramila Matthew MD at [address] Left 12/14/2021    .  performed by Ramila Matthew MD at [address] Left 1/18/2022    RE-EXCISION LEFT LUMPECTOMY; performed by Ramila Matthew MD at [facility] Right    [address] Left 1982    orbit repair   [address] Left     PORT SURGERY Right 1/18/2022    RIGHT SUBCLAVIAN PORT PLACEMENT performed by Max Hermosillo MD at [address] LOC OF LEFT BREAST Left 12/9/2021    US GUIDED NEEDLE LOC OF LEFT BREAST 12/9/2021 [facility] ULTRASOUND    US GUIDED NEEDLE LOC OF LEFT BREAST Left 12/9/2021    US GUIDED NEEDLE LOC OF LEFT BREAST 12/9/2021 [facility] ULTRASOUND       Medications Prior to Admission:      Prior to Admission medications    Medication Sig Start Date End Date Taking? Authorizing Provider   levothyroxine (SYNTHROID) 75 MCG tablet Take 75 mcg by mouth Daily ALTERNATES EVERY OTHER DAY WITH 50MCG   Yes Historical Provider, MD   anastrozole (ARIMIDEX) 1 MG tablet Take 1 mg by mouth 12/22/21   Historical Provider, MD   Venlafaxine HCl (EFFEXOR XR PO) Take 187.5 mg by mouth daily    Historical Provider, MD   atomoxetine (STRATTERA) 40 MG capsule Take 40 mg by mouth daily    Historical Provider, MD   amLODIPine (NORVASC) 10 MG tablet Take 10 mg by mouth daily    Historical Provider, MD   levothyroxine (SYNTHROID) 50 MCG tablet Take 50 mcg by mouth Daily ALTERNATES 75MCG [address] [facility]    Historical Provider, MD   gabapentin (NEURONTIN) 100 MG capsule Take 200 mg by mouth nightly. 2 tabs     Historical Provider, MD   Cholecalciferol (VITAMIN D3) 50 MCG (2000 UT) CAPS Take by mouth daily    Historical Provider, MD   calcium carbonate 600 MG TABS tablet Take 1 tablet by mouth daily    Historical Provider, MD   famotidine (PEPCID) 20 MG tablet Take 20 mg by mouth as needed    Historical Provider, MD   ZOCOR 20 MG tablet TAKE ONE TABLET BY MOUTH EVERY NIGHT AT BEDTIME 12/15/11   Luis Mejia MD         Allergies:  Patient has no known allergies.     PHYSICAL EXAM:      /81   Pulse 78   Temp 97.5 °F (36.4 °C) (Temporal)   Resp 15   Ht 5' 3\" (1.6 m)   Wt 150 lb 12.8 oz (68.4 kg)   LMP  (LMP Unknown)   SpO2 97%   BMI 26.71 kg/m²      Airway:  Airway patent with no audible stridor    Heart:  Regular rate and rhythm, No murmur noted    Lungs:  No increased work of breathing, good air exchange, clear to auscultation bilaterally, no crackles or wheezing    Abdomen:  Soft, non-distended, non-tender, no rebound tenderness or guarding, and no masses palpated    ASSESSMENT AND PLAN     Patient is a 67 y.o. female with above specified procedure planned. 1.  The patients history and physical was obtained and signed off by the pre-admission testing department. Patient seen and focused exam done today- no new changes since last physical exam on 5/23/22    2. Access to ancillary services are available per request of the provider.     Da Stein, THALIA - EDWARD     6/7/2022

## 2022-06-07 NOTE — OP NOTE
changed. Two sheets of Alloderm ADM were then utilized to wrap the expander using 2-0 PDS suture after being placed in antibiotic solution itself. The tabs were then marked with a marking pen and the expander was placed into the prepectoral space. The expander was secured in place using 2-0 PDS sutures at the 3, 6, & 9 o'clock tab positions. A 15F drain was then brought out from a separate stab incision and secured in place using 3-0 Nylon suture. Approximately 500 mL of air was placed into the expander without evidence of tension on the mastectomy skin flaps. SPY angiography was performed revealing satisfactory perfusion of the skin flaps. The wound was then closed in layers using 3-0 Monocryl suture. The patient was then awakened, extubated, and taken to the PACU in stable condition. At the end of the case, all counts were correct and there were no immediate complications. The patient tolerated the procedure well. DRAINS: 1 (15F in the left breast)    SPECIMEN: None from plastics    CONDITION: Stable    MASTECTOMY SPECIMEN WEIGHT: L 301 grams    IMPLANTS:  (Left)   500 mL [place] High Profile, reference# [id number], lot# [id number]. (Left)  Alloderm RTU, contour, perforated; size (328 cm^2)); ref# [id number], lot# [id number] & [id number].     Serge Winkler MD

## 2022-06-07 NOTE — PROGRESS NOTES
PACU Transfer to [facility]    Procedure(s):  MASTECTOMY, NIPPLE SPARING LATERALITY, LEFT BREAST  LEFT BREAST RECONSTRUCTION WITH STAGE 1 TISSUE EXPANDER PLACEMENT WITH ALLODERM        Pt meets criteria to transfer to next phase of care per JF SCORE and ASPAN standards    No results for input(s): POCGLU in the last 72 hours. Vitals:    06/07/22 1345   BP: (!) 119/55   Pulse: 77   Resp: 19   Temp: 97 °F (36.1 °C)   SpO2: 96%      BP within 20% of pt's admitting BP as per Jf Score      Intake/Output Summary (Last 24 hours) at 6/7/2022 1402  Last data filed at 6/7/2022 1350  Gross per 24 hour   Intake 2040 ml   Output 75 ml   Net 1965 ml       Pain assessment:  none  Pain Level: 0      Patient transferred to care of [facility] RN.    Family updated and directed to [facility]    6/7/2022 2:02 PM

## 2022-06-07 NOTE — FLOWSHEET NOTE
Dr. Baldomero Brown called for DC order. Pt sitting up tolerating ice chips. Pt denies any pain. Will continue to monitor.

## 2022-06-07 NOTE — ANESTHESIA POSTPROCEDURE EVALUATION
Department of Anesthesiology  Postprocedure Note    Patient: Tati Montero  MRN: [id number]  YOB: 1950  Date of evaluation: 6/7/2022  Time:  5:30 PM     Procedure Summary     Date: 06/07/22 Room / Location: [facility] [address]    Anesthesia Start: 4121 Anesthesia Stop: 0729    Procedures:       MASTECTOMY, NIPPLE SPARING LATERALITY, LEFT BREAST (Left Breast)      LEFT BREAST RECONSTRUCTION WITH STAGE 1 TISSUE EXPANDER PLACEMENT WITH ALLODERM (Left ) Diagnosis:       Malignant neoplasm of left female breast, unspecified estrogen receptor status, unspecified site of breast ([address]      Malignant neoplasm of upper-outer quadrant of left female breast, unspecified estrogen receptor status ([address]      (Malignant neoplasm of left female breast, unspecified estrogen receptor status, unspecified site of breast ([address] [C50.912] Malignant neoplasm of upper-outer quadrant of left female breast, unspecified estrogen receptor status ([address] Llewellyn Harada)    Surgeons: Bill Bustos MD; Jaylen Flores MD Responsible Provider: Meño Lara DO    Anesthesia Type: general ASA Status: 2          Anesthesia Type: No value filed. Shahnaz Phase I: Shahnaz Score: 10    Shahnaz Phase II: Shahnaz Score: 10    Last vitals: Reviewed and per EMR flowsheets.        Anesthesia Post Evaluation    Patient location during evaluation: PACU  Patient participation: complete - patient participated  Level of consciousness: awake and alert  Pain score: 1  Airway patency: patent  Nausea & Vomiting: no nausea and no vomiting  Complications: no  Cardiovascular status: hemodynamically stable  Respiratory status: acceptable  Hydration status: euvolemic

## 2022-06-09 ENCOUNTER — TELEPHONE (OUTPATIENT)
Dept: SURGERY | Age: 72
End: 2022-06-09

## 2022-06-09 NOTE — TELEPHONE ENCOUNTER
I spoke with patient this PM.  Patient  stated they are doing well after surgery. Patient stated that pain is manageable and their intake is adequate. Instructed to take 650 mg of Tylenol every 8 hours and then take the Vicodin alternating for break through pain. Postoperative instructions reinforced. All questions answered. Patient confirmed that they would call with any issues or problems.                 KUWLINDER KaufmanN, RN [address] [place] [address] 177 and Reconstructive Surgery  [phone]

## 2022-06-14 ENCOUNTER — OFFICE VISIT (OUTPATIENT)
Dept: SURGERY | Age: 72
End: 2022-06-14

## 2022-06-14 VITALS
SYSTOLIC BLOOD PRESSURE: 138 MMHG | TEMPERATURE: 98 F | WEIGHT: 150 LBS | HEART RATE: 75 BPM | OXYGEN SATURATION: 100 % | BODY MASS INDEX: 26.57 KG/M2 | DIASTOLIC BLOOD PRESSURE: 77 MMHG

## 2022-06-14 DIAGNOSIS — Z09 POSTOP CHECK: Primary | ICD-10-CM

## 2022-06-14 PROCEDURE — 99024 POSTOP FOLLOW-UP VISIT: CPT

## 2022-06-14 NOTE — PROGRESS NOTES
[facility]      PROCEDURE:1) Immediate left stage I breast reconstruction with tissue expander placement  2) Insertion of Alloderm Acellular Dermal Matrix (328 cm^2))   3) Intraoperative SPY fluorescent angiography                             DATE: 9/1/46    Ricardo Kapoor has been recovering well since her procedure. Pain has been well controlled with prescribed pain medications. EXAM    /77   Pulse 75   Temp 98 °F (36.7 °C)   Wt 150 lb (68 kg)   LMP  (LMP Unknown)   SpO2 100%   BMI 26.57 kg/m²     GEN: NAD  BREAST: Incision is healing well. No hematoma/seroma. Drains serosang. IMP: 67 y. o.female s/p L mastectomy with TE placement   PLAN: Doing well. Will return in 1 week for potential drain removal and begin TE fill. She is scheduled to see radiation in early July to be marked so we will need to get her completely filled before this.        Paxton Lara, APRN - [address] [facility] Reconstructive Surgery  [phone]  06/14/22

## 2022-06-23 ENCOUNTER — OFFICE VISIT (OUTPATIENT)
Dept: SURGERY | Age: 72
End: 2022-06-23

## 2022-06-23 VITALS
BODY MASS INDEX: 26.57 KG/M2 | WEIGHT: 150 LBS | DIASTOLIC BLOOD PRESSURE: 78 MMHG | HEART RATE: 80 BPM | OXYGEN SATURATION: 98 % | TEMPERATURE: 98.7 F | SYSTOLIC BLOOD PRESSURE: 140 MMHG

## 2022-06-23 DIAGNOSIS — Z09 POSTOP CHECK: Primary | ICD-10-CM

## 2022-06-23 PROCEDURE — 99024 POSTOP FOLLOW-UP VISIT: CPT

## 2022-06-23 NOTE — PROGRESS NOTES
[facility]    PROCEDURE: 1) Immediate left stage I breast reconstruction with tissue expander placement  2) Insertion of Alloderm Acellular Dermal Matrix (328 cm^2))   3) Intraoperative SPY fluorescent angiography  DATE: 2/4/31    Zeus Ryder has been recovering well since her procedure. Pain has been well controlled with prescribed pain medications. Her output from drains today are still very high. We are unable to remove them today. However, we will begin her TE fills so that she is done by the time of her radiologist appointment 7/6/22.       EXAM    BP (!) 140/78   Pulse 80   Temp 98.7 °F (37.1 °C)   Wt 150 lb (68 kg)   LMP  (LMP Unknown)   SpO2 98%   BMI 26.57 kg/m²      GEN: NAD  BREAST: Left breast incision healing well. No hematoma/seroma. Drain serosang. IMP: 67 y. o.female s/p L mastectomy with TE placement  PLAN: Doing well. TE fill began with 500 ml of air removed using sterile technique. Followed by injecting 250 ml of sterile saline into expander (500 ml). Will follow up next week for potential drain removal and continuation of TE fill. Will call with any concerns in the interim.       Smith Thornton, APRN - [address] [facility] Reconstructive Surgery  [phone]  06/23/22

## 2022-06-30 ENCOUNTER — OFFICE VISIT (OUTPATIENT)
Dept: SURGERY | Age: 72
End: 2022-06-30

## 2022-06-30 VITALS
DIASTOLIC BLOOD PRESSURE: 78 MMHG | WEIGHT: 150 LBS | SYSTOLIC BLOOD PRESSURE: 142 MMHG | HEART RATE: 88 BPM | OXYGEN SATURATION: 98 % | TEMPERATURE: 98.6 F | BODY MASS INDEX: 26.57 KG/M2

## 2022-06-30 DIAGNOSIS — Z09 POSTOP CHECK: Primary | ICD-10-CM

## 2022-06-30 PROCEDURE — 99024 POSTOP FOLLOW-UP VISIT: CPT

## 2022-06-30 NOTE — PROGRESS NOTES
[facility]      PROCEDURE: 1) Immediate left stage I breast reconstruction with tissue expander placement  2) Insertion of Alloderm Acellular Dermal Matrix (328 cm^2))   3) Intraoperative SPY fluorescent angiography  DATE: 9/450    Tati Montero has been recovering well since her procedure. Pain has been well controlled with prescribed pain medications. Patient presents today to have additional TE fill and bilateral drains removed. Patient meets with radiology 7/6/22 to be marked. EXAM    BP (!) 142/78   Pulse 88   Temp 98.6 °F (37 °C)   Wt 150 lb (68 kg)   LMP  (LMP Unknown)   SpO2 98%   BMI 26.57 kg/m²      GEN: NAD  BREAST: Incision healing well. No hematoma/seorma. Drain serosang. IMP: 67 y. o.female s/p L mastectomy with TE placement  PLAN: Doing well. Drain removed. 250 ml of sterile saline placed into expander (500 ml), bringing the total to 500 ml (500 ml expander). She will call us with any concerns. She will begin her radiation therapy and will follow up with us 6 months after completion. She will also call me once she has finished radiation for a referral to PT.          Tyler Slade, APRN - [address] Reconstructive Surgery  [phone]  06/30/22

## 2022-07-08 ENCOUNTER — TELEPHONE (OUTPATIENT)
Dept: SURGERY | Age: 72
End: 2022-07-08

## 2022-07-08 NOTE — TELEPHONE ENCOUNTER
Infectious Disease progress Note        Reason: RUL cavitary pneumonia    Current abx Prior abx   Piperacillin/tazobactam since 6/3/2021 levofloxacin, vancomycin 6/3-6/4     Lines:       Assessment :     59 y.o.  male with hx of DJD, COPD, chronic compression fractures of T11/T12 vertebrae, current tobacco abuse who presented to the ED on 6/3/2021 with acute onset BLE weakness.      CTA chest negative for PE, consolidation and right upper lobe with multiple smaller pockets, developing aggressive infectious process suspected, enlarged mediastinal nodes, chronic anterior wedging deformities at T3, T11 and T12. Clinical presentation consistent with chronic necrotizing pneumonia right upper lung-exact etiology not entirely clear at this time. Differential diagnosis includes: Malignancy/indolent infection chronic silent aspiration pneumonia    Lack of high-grade fever, lack of leukocytosis, duration of symptoms argues against atypical community-acquired pneumonia, MRSA pneumonia. Lack of night sweats, fevers argues against tuberculosis. Negative sputum AFB x 1 on 6/4/21  History of smoking, weight loss, duration of symptoms likely point out to malignancy. Pulmonary follow-up appreciated. Plans for bronchoscopy noted. Improved cough. Recommendations:    1. Continue piperacillin/tazobactam.   2. f/u sputum culture, sputum AFB every 8 hours x 3   3. Agree with plan for bronchoscopy/biopsy if sputum AFB negative  4. Monitor clinically       Above plan was discussed in details with patient, RN and dr Brennen Burroughs. Please call me if any further questions or concerns. Will continue to participate in the care of this patient. HPI:     Improved cough. Minimal phlegm. Is upset that he is stuck in the room which is very hot.   He wishes to leave the hospital    home Medication List    Details   fluticasone propion-salmeteroL (ADVAIR/WIXELA) 250-50 mcg/dose diskus inhaler Take 1 Puff by inhalation every twelve Left a message for Janis to return call to schedule TE fluid to be removed for assistance with radiation. (12) hours. Qty: 1 Inhaler, Refills: 3      Calcium Carbonate-Vit D3-Min 600 mg calcium- 400 unit tab Take 1 Tab by mouth two (2) times a day. With full glass of water  Qty: 60 Tab, Refills: 5      DULoxetine (CYMBALTA) 20 mg capsule 1 tab po in the morning with food  Qty: 30 Cap, Refills: 1      aspirin 81 mg chewable tablet Take 1 Tab by mouth daily. Qty: 1 Tab, Refills: 0             Current Facility-Administered Medications   Medication Dose Route Frequency    heparin (porcine) injection 5,000 Units  5,000 Units SubCUTAneous Q8H    melatonin tablet 3 mg  3 mg Oral QHS    thiamine HCL (B-1) tablet 100 mg  100 mg Oral DAILY    nicotine (NICODERM CQ) 14 mg/24 hr patch 1 Patch  1 Patch TransDERmal DAILY    sodium chloride (NS) flush 5-10 mL  5-10 mL IntraVENous PRN    piperacillin-tazobactam (ZOSYN) 4.5 g in 0.9% sodium chloride (MBP/ADV) 100 mL MBP  4.5 g IntraVENous Q6H    ondansetron (ZOFRAN) injection 4 mg  4 mg IntraVENous Q4H PRN    acetaminophen (TYLENOL) tablet 650 mg  650 mg Oral Q6H PRN    Or    acetaminophen (TYLENOL) suppository 650 mg  650 mg Rectal Q6H PRN    polyethylene glycol (MIRALAX) packet 17 g  17 g Oral DAILY PRN    ondansetron (ZOFRAN ODT) tablet 4 mg  4 mg Oral Q8H PRN    sodium chloride (NS) flush 5-40 mL  5-40 mL IntraVENous Q8H    therapeutic multivitamin (THERAGRAN) tablet 1 Tablet  1 Tablet Oral DAILY    guaiFENesin ER (MUCINEX) tablet 600 mg  600 mg Oral Q12H       Allergies: Niacin    Temp (24hrs), Av.8 °F (36.6 °C), Min:97 °F (36.1 °C), Max:98.2 °F (36.8 °C)    Visit Vitals  BP (!) 141/86   Pulse 88   Temp 97.6 °F (36.4 °C)   Resp 18   Ht 6' (1.829 m)   Wt 50.8 kg (112 lb)   SpO2 95%   BMI 15.19 kg/m²       ROS: 12 point ROS obtained in details.  Pertinent positives as mentioned in HPI,   otherwise negative    Physical Exam:    General:  Alert, cooperative, no distress, very thin, coughing episodes while I was in room   Head:  Normocephalic, without obvious abnormality, atraumatic. Eyes:  Conjunctivae/corneas clear. ANicteric   Lungs:   Bilateral chest movements equal, absent RUL BS, decreased B/L, no wheezing or rales. Chest wall:  No tenderness or deformity. Heart:  Regular rate and rhythm, S1, S2 normal, no  click, rub or gallop. Abdomen:   Soft, non-tender. Bowel sounds normal. No masses,  No organomegaly. Extremities: normal, atraumatic, no cyanosis or edema. Neurologic: Grossly nonfocal  Skin: no rash or ulcers  Psychiatry: appropriate mood and affect  Back: no spinal/paraspinal muscle tenderness or rigidity                                  Labs: Results:   Chemistry Recent Labs     06/07/21  0324 06/06/21  0244 06/05/21  0320   GLU 83 79 72*   * 131* 130*   K 3.7 3.7 3.5    103 101   CO2 24 23 22   BUN 3* 5* 5*   CREA 0.24* 0.22* 0.20*   CA 7.7* 7.9* 7.6*   AGAP 7 5 7   BUCR 13 23* 25*      CBC w/Diff Recent Labs     06/07/21  0324 06/06/21  0244 06/05/21  0320   WBC 8.3 7.7 8.7   RBC 2.76* 2.63* 2.60*   HGB 8.5* 8.0* 8.2*   HCT 25.1* 24.2* 23.6*    355 349   GRANS 70 74* 79*   LYMPH 20* 20* 14*   EOS 3 2 3      Microbiology Recent Labs     06/04/21  1308 06/04/21  1300   CULT FEW NORMAL RESPIRATORY LE MRSA NOT PRESENT  Screening of patient nares for MRSA is for surveillance purposes and, if positive, to facilitate isolation considerations in high risk settings. It is not intended for automatic decolonization interventions per se as regimens are not sufficiently effective to warrant routine use. RADIOLOGY:    All available imaging studies/reports in [facility] for this admission were reviewed      Disclaimer: Sections of this note are dictated utilizing voice recognition software, which may have resulted in some phonetic based errors in grammar and contents. Even though attempts were made to correct all the mistakes, some may have been missed, and remained in the body of the document.  If questions arise, please contact our department.     Dr. Unique Jose, Infectious Disease Specialist  [phone]  June 7, 2021  12:22 PM

## 2022-07-11 ENCOUNTER — NURSE ONLY (OUTPATIENT)
Dept: SURGERY | Age: 72
End: 2022-07-11

## 2022-07-11 NOTE — PROGRESS NOTES
[facility]    PROCEDURE:   MASTECTOMY, NIPPLE SPARING LATERALITY, LEFT BREAST   LEFT BREAST RECONSTRUCTION WITH STAGE 1 TISSUE EXPANDER PLACEMENT WITH ALLODERM       DATE: 16/82/0749    Lucia Oleary has been recovering well since her procedure. Dr. Drew Chen requested,at the direction of Dr. Amira Roy, to remove 100 cc of saline from the left TE. Patient is to start radiation later this month. EXAM    GEN: NAD  BREAST: Incision healing well. No hematoma/seorma. IMP: 67 y. o.female s/p Left TE. PLAN:  A total of 100 cc of saline was removed  today bringing the total to 400 cc in the left breast.  (Implant size 500 cc).      KULWINDER AyersN, RN [address] [place] [address] 177 and Reconstructive Surgery  [phone]  07/11/22

## 2022-07-26 ENCOUNTER — OFFICE VISIT (OUTPATIENT)
Dept: SURGERY | Age: 72
End: 2022-07-26

## 2022-07-26 VITALS
OXYGEN SATURATION: 98 % | TEMPERATURE: 98.6 F | WEIGHT: 150 LBS | HEART RATE: 88 BPM | SYSTOLIC BLOOD PRESSURE: 142 MMHG | DIASTOLIC BLOOD PRESSURE: 78 MMHG | BODY MASS INDEX: 26.57 KG/M2

## 2022-07-26 DIAGNOSIS — Z09 POSTOP CHECK: Primary | ICD-10-CM

## 2022-07-26 PROCEDURE — 99024 POSTOP FOLLOW-UP VISIT: CPT

## 2022-07-26 NOTE — PROGRESS NOTES
[facility]    PROCEDURE: 1) Immediate left stage I breast reconstruction with tissue expander placement  2) Insertion of Alloderm Acellular Dermal Matrix (328 cm^2))  3) Intraoperative SPY fluorescent angiography  DATE: 0/0/07    Julian Mccain has been recovering well since her procedure. Pain has been well controlled without pain medications. She states that she fell 10 days ago and is worried about TE. She was marked by radiation therapy and is to begin radiation therapy soon. EXAM    BP (!) 142/78   Pulse 88   Temp 98.6 °F (37 °C)   Wt 150 lb (68 kg)   LMP  (LMP Unknown)   SpO2 98%   BMI 26.57 kg/m²      GEN: NAD  BREAST: Incision healing well. No hematoma/seroma. IMP: 67 y. o.female s/p L mastectomy with TE placement  PLAN: Doing well overall. No concerns to breast seen on visual evaluation. No bruising or seromas. Offered breast ultrasound if patient would feel better, she doesn't feel that is necessary. However, she will call us if she develops any additional symptoms. Okay to proceed with radiation therapy and she will follow up with us 6 months after completion. She will call once she finishes radiation and we will send referral for PT.          Preet Pulliam, APRN - [address] [facility] Reconstructive Surgery  [phone]  07/26/22

## 2022-11-17 ENCOUNTER — OFFICE VISIT (OUTPATIENT)
Dept: SURGERY | Age: 72
End: 2022-11-17
Payer: MEDICARE

## 2022-11-17 VITALS
OXYGEN SATURATION: 98 % | HEART RATE: 88 BPM | SYSTOLIC BLOOD PRESSURE: 135 MMHG | TEMPERATURE: 97.9 F | DIASTOLIC BLOOD PRESSURE: 78 MMHG

## 2022-11-17 DIAGNOSIS — Z92.3 STATUS POST RADIATION THERAPY: Primary | ICD-10-CM

## 2022-11-17 PROCEDURE — 1123F ACP DISCUSS/DSCN MKR DOCD: CPT

## 2022-11-17 PROCEDURE — G8400 PT W/DXA NO RESULTS DOC: HCPCS

## 2022-11-17 PROCEDURE — 1036F TOBACCO NON-USER: CPT

## 2022-11-17 PROCEDURE — G8428 CUR MEDS NOT DOCUMENT: HCPCS

## 2022-11-17 PROCEDURE — 99213 OFFICE O/P EST LOW 20 MIN: CPT

## 2022-11-17 PROCEDURE — 1090F PRES/ABSN URINE INCON ASSESS: CPT

## 2022-11-17 PROCEDURE — G8417 CALC BMI ABV UP PARAM F/U: HCPCS

## 2022-11-17 PROCEDURE — 3017F COLORECTAL CA SCREEN DOC REV: CPT

## 2022-11-17 PROCEDURE — G8484 FLU IMMUNIZE NO ADMIN: HCPCS

## 2022-11-17 NOTE — PROGRESS NOTES
[facility]    PROCEDURE:  1) Immediate left stage I breast reconstruction with tissue expander placement  2) Insertion of Alloderm Acellular Dermal Matrix (328 cm^2))  3) Intraoperative SPY fluorescent angiography  DATE: 9/1/01    Rakesh Kaye has been recovering well since her procedure. Pain has been well controlled without pain medications. She just finished her radiation therapy in September. PMHx:   Past Medical History:   Diagnosis Date    Breast cancer ([address] 2004    right    Breast cancer ([address] 2021    left    Depression     Hyperlipidemia     Hypertension     Hypothyroid     Sleep apnea     wears CPAP     PSHx:   Past Surgical History:   Procedure Laterality Date    BREAST BIOPSY Left 11/2021    BREAST BIOPSY Left 12/14/2021    LEFT BREAST LUMPECTOMY, LEFT BREAST SENTINEL NODE BIOPSY (2:00)PM, COMPLETION AXILLARY DISSECTION, ULTRASOUND LATERALITY, LEFT BREAST 2 RADIOFREQUENCY TAGS, 1:00 5CM FTN AND 2:00 7CM FTN, TARGET MASS X 2 (12/9/2021) 10:30AM performed by Tere Castle MD at [facility] Left 6/7/2022    LEFT BREAST RECONSTRUCTION WITH STAGE 1 TISSUE EXPANDER PLACEMENT WITH ALLODERM performed by Ning Tovar MD at [facility]. LUMPECTOMY Right 2004    BREAST SURGERY Left 12/14/2021    .  performed by Tere Castle MD at [address] Left 1/18/2022    RE-EXCISION LEFT LUMPECTOMY; performed by Tere Castle MD at [place] [address]. Left 1982    orbit repair    HYSTERECTOMY (CERVIX STATUS UNKNOWN)  [address] Left     bone spur removal    MASTECTOMY Left 6/7/2022    MASTECTOMY, NIPPLE SPARING LATERALITY, LEFT BREAST performed by Tere Castle MD at [address] Left     PORT SURGERY Right 1/18/2022    RIGHT SUBCLAVIAN PORT PLACEMENT performed by Tere Castle MD at [address] [place] [address]  LOC OF LEFT BREAST Left 12/9/2021    US GUIDED NEEDLE LOC OF LEFT BREAST 12/9/2021 [facility] ULTRASOUND    US GUIDED NEEDLE LOC OF LEFT BREAST Left 12/9/2021    US GUIDED NEEDLE LOC OF LEFT BREAST 12/9/2021 [facility] ULTRASOUND     Allergy: No Known Allergies    SHx:   Social History     Socioeconomic History    Marital status:      Spouse name: Not on file    Number of children: Not on file    Years of education: Not on file    Highest education level: Not on file   Occupational History    Not on file   Tobacco Use    Smoking status: Never    Smokeless tobacco: Never   Vaping Use    Vaping Use: Never used   Substance and Sexual Activity    Alcohol use: Yes     Comment: rarely    Drug use: Never    Sexual activity: Not on file   Other Topics Concern    Not on file   Social History Narrative    Not on file     Social Determinants of Health     Financial Resource Strain: Not on file   Food Insecurity: Not on file   Transportation Needs: Not on file   Physical Activity: Not on file   Stress: Not on file   Social Connections: Not on file   Intimate Partner Violence: Not on file   Housing Stability: Not on file     FHx: Family history reviewed and is noncontributory  Meds:   Current Outpatient Medications   Medication Sig Dispense Refill    acetaminophen (TYLENOL) 500 MG tablet Take 1,000 mg by mouth every 6 hours as needed for Pain      levothyroxine (SYNTHROID) 75 MCG tablet Take 75 mcg by mouth Daily ALTERNATES EVERY OTHER DAY WITH 50MCG      anastrozole (ARIMIDEX) 1 MG tablet Take 1 mg by mouth      Venlafaxine HCl (EFFEXOR XR PO) Take 187.5 mg by mouth daily      atomoxetine (STRATTERA) 40 MG capsule Take 40 mg by mouth daily      amLODIPine (NORVASC) 10 MG tablet Take 10 mg by mouth daily      levothyroxine (SYNTHROID) 50 MCG tablet Take 50 mcg by mouth Daily ALTERNATES 75MCG EVERY OTHER DAY WITH 50MCG      gabapentin (NEURONTIN) 100 MG capsule Take 200 mg by mouth nightly.  2 tabs       Cholecalciferol (VITAMIN D3) 50 MCG (2000 UT) CAPS Take by mouth daily      calcium carbonate 600 MG TABS tablet Take 1 tablet by mouth daily      famotidine (PEPCID) 20 MG tablet Take 20 mg by mouth as needed      ZOCOR 20 MG tablet TAKE ONE TABLET BY MOUTH EVERY NIGHT AT BEDTIME 30 tablet 1     No current facility-administered medications for this visit. ROS   Constitutional: Negative for chills and fever. HENT: Negative for congestion, facial swelling, and voice change. Eyes: Negative for photophobia and visual disturbance. Respiratory: Negative for apnea, cough, chest tightness and shortness of breath. Cardiovascular: Negative for chest pain and palpitations. Gastrointestinal: Negative for dysphagia and early satiety. Genitourinary: Negative for difficulty urinating, dysuria, flank pain, frequency and hematuria. Musculoskeletal: Negative for new gait problem, joint swelling and myalgias. Skin: Negative for color change, pallor and rash. Endocrine: negative for tremors, temperature intolerance or polydipsia. Allergic/Immunologic: Negative for new environmental or food allergies. Neurological: Negative for dizziness, seizures, speech difficulty, numbness. Hematological: Negative for adenopathy. Psychiatric/Behavioral: Negative for agitation and confusion. EXAM    /78   Pulse 88   Temp 97.9 °F (36.6 °C)   LMP  (LMP Unknown)   SpO2 98%       GEN: NAD   BREAST: Incision site healing appropriately. No hematoma/seroma. Some radiation changed to left breast.     IMP: 67 y. o.female s/p L mastectomy with TE placement  PLAN: Doing well overall. Sent referral to begin PT therapy to help post radiation. She will follow up with us around June to discuss second stage. She will call in the interim with any additional concerns.      Katerina Holbrook, APRN - [address] [facility] Reconstructive Surgery  [phone]  11/17/22

## 2023-09-20 ENCOUNTER — OFFICE VISIT (OUTPATIENT)
Dept: SURGERY | Age: 73
End: 2023-09-20
Payer: MEDICARE

## 2023-09-20 VITALS
TEMPERATURE: 98 F | OXYGEN SATURATION: 99 % | DIASTOLIC BLOOD PRESSURE: 77 MMHG | BODY MASS INDEX: 26.04 KG/M2 | RESPIRATION RATE: 18 BRPM | SYSTOLIC BLOOD PRESSURE: 114 MMHG | WEIGHT: 147 LBS | HEART RATE: 82 BPM

## 2023-09-20 DIAGNOSIS — Z09 POSTOP CHECK: Primary | ICD-10-CM

## 2023-09-20 PROCEDURE — G8427 DOCREV CUR MEDS BY ELIG CLIN: HCPCS | Performed by: SURGERY

## 2023-09-20 PROCEDURE — 1036F TOBACCO NON-USER: CPT | Performed by: SURGERY

## 2023-09-20 PROCEDURE — 3017F COLORECTAL CA SCREEN DOC REV: CPT | Performed by: SURGERY

## 2023-09-20 PROCEDURE — 99213 OFFICE O/P EST LOW 20 MIN: CPT | Performed by: SURGERY

## 2023-09-20 PROCEDURE — 1090F PRES/ABSN URINE INCON ASSESS: CPT | Performed by: SURGERY

## 2023-09-20 PROCEDURE — 1123F ACP DISCUSS/DSCN MKR DOCD: CPT | Performed by: SURGERY

## 2023-09-20 PROCEDURE — G8417 CALC BMI ABV UP PARAM F/U: HCPCS | Performed by: SURGERY

## 2023-09-20 PROCEDURE — G8400 PT W/DXA NO RESULTS DOC: HCPCS | Performed by: SURGERY

## 2023-09-20 RX ORDER — BUPROPION HYDROCHLORIDE 150 MG/1
150 TABLET ORAL EVERY MORNING
COMMUNITY

## 2023-09-20 RX ORDER — IBUPROFEN 200 MG
200 TABLET ORAL EVERY 6 HOURS PRN
COMMUNITY

## 2023-09-20 NOTE — PROGRESS NOTES
Mily Almodovar MD  [address] [place] [address] Reconstructive Surgery  [phone]  09/20/23

## 2023-09-22 ENCOUNTER — TELEPHONE (OUTPATIENT)
Dept: SURGERY | Age: 73
End: 2023-09-22

## 2023-09-22 NOTE — TELEPHONE ENCOUNTER
The patient was in the office to see Dr. Antonietta Rodrigez 3-. PLAN: Will plan for exchange for permanent implant on the left to match the smaller right breast and perform revision on the right with fat grafting. We discussed the limitations of implant based reconstruction in the setting of radiation and she understands and desires to proceed. Will schedule. I received a surgery letter. The patient has Medicare A&B as her primary insurance. CPT Codes 00361 and 96314 do not require pre certification. I spoke with Lillian Felder at [facility] ([phone]) to see if they require pre certification with Medicare as primary insurance. [facility] follows Medicare guidelines so no pre certification or pre authorization is required with this plan. Call Reference # [id number]    I spoke with the patient at the home number listed to discuss insurance and surgery scheduling. She was provided several surgery dates. She will discuss the dates with her family and call me back to schedule. The patient would like to meet with anesthesia prior to surgery. I will leave this phone note open.

## 2023-10-27 NOTE — TELEPHONE ENCOUNTER
I spoke with the patient at the home number listed to follow up on the message below. The patient is now scheduled for surgery with  on 2-8-2024. The patient is aware of H&P. The patient is scheduled for her post op appointment 2-. I will submit the surgery letter to [facility] today. I will mail the surgery information and instructions to the patient today. I will close this phone note.

## 2023-12-11 ENCOUNTER — OFFICE VISIT (OUTPATIENT)
Dept: SURGERY | Age: 73
End: 2023-12-11
Payer: MEDICARE

## 2023-12-11 VITALS
WEIGHT: 146 LBS | TEMPERATURE: 97.6 F | OXYGEN SATURATION: 98 % | DIASTOLIC BLOOD PRESSURE: 87 MMHG | SYSTOLIC BLOOD PRESSURE: 148 MMHG | BODY MASS INDEX: 25.86 KG/M2

## 2023-12-11 DIAGNOSIS — C50.912 MALIGNANT NEOPLASM OF LEFT FEMALE BREAST, UNSPECIFIED ESTROGEN RECEPTOR STATUS, UNSPECIFIED SITE OF BREAST (HCC): Primary | ICD-10-CM

## 2023-12-11 DIAGNOSIS — Z92.3 STATUS POST RADIATION THERAPY: ICD-10-CM

## 2023-12-11 PROCEDURE — 1090F PRES/ABSN URINE INCON ASSESS: CPT | Performed by: SURGERY

## 2023-12-11 PROCEDURE — G8427 DOCREV CUR MEDS BY ELIG CLIN: HCPCS | Performed by: SURGERY

## 2023-12-11 PROCEDURE — 1123F ACP DISCUSS/DSCN MKR DOCD: CPT | Performed by: SURGERY

## 2023-12-11 PROCEDURE — 1036F TOBACCO NON-USER: CPT | Performed by: SURGERY

## 2023-12-11 PROCEDURE — 99213 OFFICE O/P EST LOW 20 MIN: CPT | Performed by: SURGERY

## 2023-12-11 PROCEDURE — 3017F COLORECTAL CA SCREEN DOC REV: CPT | Performed by: SURGERY

## 2023-12-11 PROCEDURE — G8417 CALC BMI ABV UP PARAM F/U: HCPCS | Performed by: SURGERY

## 2023-12-11 PROCEDURE — G8400 PT W/DXA NO RESULTS DOC: HCPCS | Performed by: SURGERY

## 2023-12-11 PROCEDURE — G8484 FLU IMMUNIZE NO ADMIN: HCPCS | Performed by: SURGERY

## 2023-12-11 NOTE — PROGRESS NOTES
grafting. We discussed the limitations of implant based reconstruction in the setting of radiation and she understands and desires to proceed. All questions answered. R/B/A/O/P discussed in detail with the patient who agrees to proceed.      Kimber Hauser MD  [address] [place] [address] Reconstructive Surgery  [phone]  12/11/23

## 2024-01-27 NOTE — PROGRESS NOTES
1/27/24 Sent Teams message to Abbi at surgeon office- reminder for Dr. Love to enter orders, including procedure consent order. /NORMA

## 2024-01-27 NOTE — PROGRESS NOTES
PRE-OP INSTRUCTIONS FOR SURGICAL PATIENTS          Our Pre-admission Testing Nurses tried and were unable to reach you today.  Please read the attached instructions if you did not listen to your voicemail.     Follow all instructions provided to you from your surgeon's office, including your ARRIVAL TIME.   Arrange for someone to drive you home and be with you for the first 24 hours after discharge.     NOTE: at this time ONLY 2 ADULTS may accompany you   One person encouraged to stay at hospital entire time if outpatient surgery    Enter the MAIN entrance located on [address] and report to the surgical desk on the LEFT side of the lobby. Please park in the parking garage or there is free  Parking available after 7am for your use.    Bring your insurance card & photo ID with you to register.  Bring your medication list with you with dose and frequency listed (including over the counter medications)  Contact your ordering physician/surgeon for medication instructions as soon as possible, especially if taking blood thinners, aspirin, heart, or diabetic medication.  Bariatric surgical patients need to call your surgeon if on diabetic medications (as some may need to be stopped 1-week preop)  A Pre-Surgical History and Physical MUST be completed WITHIN 30 DAYS OR LESS prior to your procedure by your Physician or an Urgent Care.  DO NOT EAT ANYTHING 8 hours prior to arrival for surgery.  You may have sips of WATER ONLY (up to 8 ounces) 4 hours prior to your arrival for surgery. Then nothing further 4 hours prior to arriving at hospital.   NOTE: ALL Gastric, Bariatric & Bowel surgery patients - you MUST follow your surgeon's instructions regarding eating/drinking as you will have very specific instructions to follow.  If you did not receive these, call your surgeon's office immediately.   No gum, candy, mints, or ice chips day of procedure.   Please refrain from drinking alcohol the day before or day of your

## 2024-01-30 ENCOUNTER — TELEPHONE (OUTPATIENT)
Dept: SURGERY | Age: 74
End: 2024-01-30

## 2024-01-30 NOTE — TELEPHONE ENCOUNTER
Patient called back stating since there is so much respiratory illness going around right now, she would prefer to not be inside the hospital, and would like to postpone surgery right now if Dr. Love agrees

## 2024-01-30 NOTE — TELEPHONE ENCOUNTER
I returned the patients call mentioned below. The patient is now rescheduled to 4-4-2024 and the post op appointment has also been rescheduled.    I will send an email to [facility] to reschedule the case.     I will make the changes on the [employer] calendar.     I will close this phone note.

## 2024-01-30 NOTE — TELEPHONE ENCOUNTER
Patient called in stating that on Sunday, 1/28/2024, she woke up with a sore throat, aches and fatigue     Yesterday, 1/29/2024, she woke up with coughing, scratchy throat, and aches     Today that patient states that she had an ear ache, scratchy throat, congestion and chills     Patient states that she has had no fever and her COVID test is negative     Patient would like to know if she is still able to have surgery on 2/1/2024     Please contact the patient at [phone]

## 2024-03-26 RX ORDER — LEVOTHYROXINE SODIUM 0.07 MG/1
75 TABLET ORAL DAILY
COMMUNITY

## 2024-03-26 RX ORDER — BUPROPION HYDROCHLORIDE 300 MG/1
300 TABLET ORAL EVERY MORNING
COMMUNITY
Start: 2023-08-07

## 2024-03-26 RX ORDER — AMLODIPINE BESYLATE 5 MG/1
5 TABLET ORAL NIGHTLY
COMMUNITY

## 2024-03-26 RX ORDER — ROSUVASTATIN CALCIUM 20 MG/1
20 TABLET, COATED ORAL DAILY
COMMUNITY

## 2024-03-26 NOTE — PROGRESS NOTES
[facility] PRE-SURGICAL TESTING INSTRUCTIONS                      PRIOR TO PROCEDURE DATE:    1. PLEASE FOLLOW ANY INSTRUCTIONS GIVEN TO YOU PER YOUR SURGEON.      2. Arrange for someone to drive you home and be with you for the first 24 hours after discharge for your safety after your procedure for which you received sedation. Ensure it is someone we can share information with regarding your discharge.     NOTE: At this time ONLY 2 ADULTS may accompany you   One person ENCOURAGED to stay at hospital entire time if outpatient surgery      3. You must contact your surgeon for instructions IF:  You are taking any blood thinners, aspirin, anti-inflammatory or vitamins.  There is a change in your physical condition such as a cold, fever, rash, cuts, sores, or any other infection, especially near your surgical site.    4. Do not drink alcohol the day before or day of your procedure.  Do not use any recreational marijuana at least 24 hours or street drugs (heroin, cocaine) at minimum 5 days prior to your procedure.     5. A Pre-Surgical History and Physical MUST be completed WITHIN 30 DAYS OR LESS prior to your procedure.by your Physician or an Urgent Care        THE DAY OF YOUR PROCEDURE:  1.  Follow instructions for ARRIVAL TIME as DIRECTED BY YOUR SURGEON.     2. Enter the MAIN entrance from [facility] and follow the signs to the free Parking Garage or  Parking (offered free of charge 7 am-5pm).      3. Enter the Main Entrance of the hospital (do not enter from the lower level of the parking garage). Upon entrance, check in with the  at the surgical information desk on your LEFT.   Bring your insurance card and photo ID to register      4. DO NOT EAT ANYTHING 8 hours prior to arrival for surgery.  You may have up to 8 ounces of water 4 hours prior to your arrival for surgery.   NOTE: ALL Gastric, Bariatric & Bowel surgery patients - you MUST follow your surgeon's instructions regarding  such as extreme drowsiness, changes in your vital signs or breathing patterns. Nausea, headache, muscle aches, or sore throat may also occur after anesthesia.  Your nurse will help you manage these potential side effects.    2. For comfort and safety, arrange to have someone at home with you for the first 24 hours after discharge.    3. You and your family will be given written instructions about your diet, activity, dressing care, medications, and return visits.     4. Once at home, should issues with nausea, pain, or bleeding occur, or should you notice any signs of infection, you should call your surgeon.    5. Always clean your hands before and after caring for your wound. Do not let your family touch your surgery site without cleaning their hands.     6. Narcotic pain medications can cause significant constipation.  You may want to add a stool softener to your postoperative medication schedule or speak to your surgeon on how best to manage this SIDE EFFECT.    SPECIAL INSTRUCTIONS     Thank you for allowing us to care for you.  We strive to exceed your expectations in the delivery of care and service provided to you and your family.     If you need to contact the Pre-Admission Testing staff for any reason, please call us at [phone]    Instructions reviewed with patient during preadmission testing phone interview.  Gaviota Crow RN.3/26/2024 .10:40 AM      ADDITIONAL EDUCATIONAL INFORMATION REVIEWED PER PHONE WITH YOU AND/OR YOUR FAMILY:  Yes Hibiclens® Bathing Instructions   Yes Antibacterial Soap

## 2024-04-02 ENCOUNTER — TELEPHONE (OUTPATIENT)
Dept: SURGERY | Age: 74
End: 2024-04-02

## 2024-04-02 NOTE — TELEPHONE ENCOUNTER
Patient called in stating she hasn't been given the surgery time yet for EXCHANGE FOR PERMANENT IMPLANT LEFT BREAST; LEFT BREAST CAPSULECTOMIES [[id number]]  BILATERAL BREAST FAT GRAFTING [[id number]]  REVISION OF RIGHT BREAST RECONSTRUCTION [[id number]] being completed on 4/4/2024.    Patient can be reached [phone].

## 2024-04-02 NOTE — TELEPHONE ENCOUNTER
I returned the patients call mentioned below. Arrival time of 10 am. Surgery time 12 pm at [facility].    I will close this phone note.

## 2024-04-04 ENCOUNTER — HOSPITAL ENCOUNTER (OUTPATIENT)
Age: 74
Setting detail: OUTPATIENT SURGERY
Discharge: HOME | End: 2024-04-04
Attending: SURGERY | Admitting: SURGERY
Payer: MEDICARE

## 2024-04-04 ENCOUNTER — ANESTHESIA EVENT (OUTPATIENT)
Dept: OPERATING ROOM | Age: 74
End: 2024-04-04
Payer: MEDICARE

## 2024-04-04 ENCOUNTER — ANESTHESIA (OUTPATIENT)
Dept: OPERATING ROOM | Age: 74
End: 2024-04-04
Payer: MEDICARE

## 2024-04-04 VITALS
RESPIRATION RATE: 16 BRPM | HEIGHT: 63 IN | DIASTOLIC BLOOD PRESSURE: 67 MMHG | WEIGHT: 140 LBS | OXYGEN SATURATION: 97 % | SYSTOLIC BLOOD PRESSURE: 113 MMHG | BODY MASS INDEX: 24.8 KG/M2 | TEMPERATURE: 97 F | HEART RATE: 77 BPM

## 2024-04-04 DIAGNOSIS — G89.18 ACUTE POST-OPERATIVE PAIN: Primary | ICD-10-CM

## 2024-04-04 DIAGNOSIS — Z17.0 MALIGNANT NEOPLASM OF LEFT BREAST IN FEMALE, ESTROGEN RECEPTOR POSITIVE, UNSPECIFIED SITE OF BREAST (HCC): ICD-10-CM

## 2024-04-04 DIAGNOSIS — C50.912 MALIGNANT NEOPLASM OF LEFT BREAST IN FEMALE, ESTROGEN RECEPTOR POSITIVE, UNSPECIFIED SITE OF BREAST (HCC): ICD-10-CM

## 2024-04-04 DIAGNOSIS — Z98.890 STATUS POST BREAST RECONSTRUCTION: ICD-10-CM

## 2024-04-04 PROCEDURE — A4217 STERILE WATER/SALINE, 500 ML: HCPCS | Performed by: SURGERY

## 2024-04-04 PROCEDURE — 6360000002 HC RX W HCPCS: Performed by: SURGERY

## 2024-04-04 PROCEDURE — 2500000003 HC RX 250 WO HCPCS: Performed by: NURSE ANESTHETIST, CERTIFIED REGISTERED

## 2024-04-04 PROCEDURE — 3600000014 HC SURGERY LEVEL 4 ADDTL 15MIN: Performed by: SURGERY

## 2024-04-04 PROCEDURE — 7100000000 HC PACU RECOVERY - FIRST 15 MIN: Performed by: SURGERY

## 2024-04-04 PROCEDURE — C1789 PROSTHESIS, BREAST, IMP: HCPCS | Performed by: SURGERY

## 2024-04-04 PROCEDURE — 2580000003 HC RX 258: Performed by: NURSE ANESTHETIST, CERTIFIED REGISTERED

## 2024-04-04 PROCEDURE — 11970 RPLCMT TISS XPNDR PERM IMPLT: CPT | Performed by: SURGERY

## 2024-04-04 PROCEDURE — 3700000000 HC ANESTHESIA ATTENDED CARE: Performed by: SURGERY

## 2024-04-04 PROCEDURE — 7100000011 HC PHASE II RECOVERY - ADDTL 15 MIN: Performed by: SURGERY

## 2024-04-04 PROCEDURE — 15772 GRFG AUTOL FAT LIPO EA ADDL: CPT | Performed by: SURGERY

## 2024-04-04 PROCEDURE — 88300 SURGICAL PATH GROSS: CPT

## 2024-04-04 PROCEDURE — 2500000003 HC RX 250 WO HCPCS: Performed by: SURGERY

## 2024-04-04 PROCEDURE — 2580000003 HC RX 258: Performed by: SURGERY

## 2024-04-04 PROCEDURE — 3600000004 HC SURGERY LEVEL 4 BASE: Performed by: SURGERY

## 2024-04-04 PROCEDURE — 2580000003 HC RX 258: Performed by: ANESTHESIOLOGY

## 2024-04-04 PROCEDURE — 6360000002 HC RX W HCPCS: Performed by: NURSE ANESTHETIST, CERTIFIED REGISTERED

## 2024-04-04 PROCEDURE — 2720000010 HC SURG SUPPLY STERILE: Performed by: SURGERY

## 2024-04-04 PROCEDURE — 2709999900 HC NON-CHARGEABLE SUPPLY: Performed by: SURGERY

## 2024-04-04 PROCEDURE — 15771 GRFG AUTOL FAT LIPO 50 CC/<: CPT | Performed by: SURGERY

## 2024-04-04 PROCEDURE — 7100000010 HC PHASE II RECOVERY - FIRST 15 MIN: Performed by: SURGERY

## 2024-04-04 PROCEDURE — 7100000001 HC PACU RECOVERY - ADDTL 15 MIN: Performed by: SURGERY

## 2024-04-04 PROCEDURE — 19380 REVJ RECONSTRUCTED BREAST: CPT | Performed by: SURGERY

## 2024-04-04 PROCEDURE — 3700000001 HC ADD 15 MINUTES (ANESTHESIA): Performed by: SURGERY

## 2024-04-04 DEVICE — SMOOTH MODERATE PLUS PROFILE, 320CC  SMOOTH ROUND SILICONE
Type: IMPLANTABLE DEVICE | Site: BREAST | Status: FUNCTIONAL
Brand: MENTOR MEMORYGEL BOOST BREAST IMPLANT

## 2024-04-04 RX ORDER — OXYCODONE HYDROCHLORIDE 5 MG/1
5 TABLET ORAL EVERY 6 HOURS PRN
Qty: 12 TABLET | Refills: 0 | Status: SHIPPED | OUTPATIENT
Start: 2024-04-04 | End: 2024-04-07

## 2024-04-04 RX ORDER — CEPHALEXIN 500 MG/1
500 CAPSULE ORAL 4 TIMES DAILY
Qty: 40 CAPSULE | Refills: 0 | Status: SHIPPED | OUTPATIENT
Start: 2024-04-04 | End: 2024-04-14

## 2024-04-04 RX ORDER — PROCHLORPERAZINE EDISYLATE 5 MG/ML
5 INJECTION INTRAMUSCULAR; INTRAVENOUS
Status: DISCONTINUED | OUTPATIENT
Start: 2024-04-04 | End: 2024-04-04 | Stop reason: HOSPADM

## 2024-04-04 RX ORDER — SODIUM CHLORIDE 0.9 % (FLUSH) 0.9 %
5-40 SYRINGE (ML) INJECTION EVERY 12 HOURS SCHEDULED
Status: DISCONTINUED | OUTPATIENT
Start: 2024-04-04 | End: 2024-04-04 | Stop reason: HOSPADM

## 2024-04-04 RX ORDER — LIDOCAINE HYDROCHLORIDE 20 MG/ML
INJECTION, SOLUTION INFILTRATION; PERINEURAL PRN
Status: DISCONTINUED | OUTPATIENT
Start: 2024-04-04 | End: 2024-04-04 | Stop reason: SDUPTHER

## 2024-04-04 RX ORDER — OXYCODONE HYDROCHLORIDE 5 MG/1
5 TABLET ORAL
Status: DISCONTINUED | OUTPATIENT
Start: 2024-04-04 | End: 2024-04-04 | Stop reason: HOSPADM

## 2024-04-04 RX ORDER — NALOXONE HYDROCHLORIDE 0.4 MG/ML
INJECTION, SOLUTION INTRAMUSCULAR; INTRAVENOUS; SUBCUTANEOUS PRN
Status: DISCONTINUED | OUTPATIENT
Start: 2024-04-04 | End: 2024-04-04 | Stop reason: HOSPADM

## 2024-04-04 RX ORDER — SODIUM CHLORIDE 0.9 % (FLUSH) 0.9 %
5-40 SYRINGE (ML) INJECTION PRN
Status: DISCONTINUED | OUTPATIENT
Start: 2024-04-04 | End: 2024-04-04 | Stop reason: HOSPADM

## 2024-04-04 RX ORDER — SODIUM CHLORIDE, SODIUM LACTATE, POTASSIUM CHLORIDE, AND CALCIUM CHLORIDE .6; .31; .03; .02 G/100ML; G/100ML; G/100ML; G/100ML
IRRIGANT IRRIGATION PRN
Status: DISCONTINUED | OUTPATIENT
Start: 2024-04-04 | End: 2024-04-04 | Stop reason: HOSPADM

## 2024-04-04 RX ORDER — SODIUM CHLORIDE 9 MG/ML
INJECTION, SOLUTION INTRAVENOUS PRN
Status: DISCONTINUED | OUTPATIENT
Start: 2024-04-04 | End: 2024-04-04 | Stop reason: HOSPADM

## 2024-04-04 RX ORDER — SODIUM CHLORIDE, SODIUM LACTATE, POTASSIUM CHLORIDE, CALCIUM CHLORIDE 600; 310; 30; 20 MG/100ML; MG/100ML; MG/100ML; MG/100ML
INJECTION, SOLUTION INTRAVENOUS CONTINUOUS
Status: DISCONTINUED | OUTPATIENT
Start: 2024-04-04 | End: 2024-04-04 | Stop reason: HOSPADM

## 2024-04-04 RX ORDER — FENTANYL CITRATE 50 UG/ML
25 INJECTION, SOLUTION INTRAMUSCULAR; INTRAVENOUS EVERY 5 MIN PRN
Status: DISCONTINUED | OUTPATIENT
Start: 2024-04-04 | End: 2024-04-04 | Stop reason: HOSPADM

## 2024-04-04 RX ORDER — ONDANSETRON 2 MG/ML
4 INJECTION INTRAMUSCULAR; INTRAVENOUS
Status: DISCONTINUED | OUTPATIENT
Start: 2024-04-04 | End: 2024-04-04 | Stop reason: HOSPADM

## 2024-04-04 RX ORDER — HYDROMORPHONE HYDROCHLORIDE 1 MG/ML
0.5 INJECTION, SOLUTION INTRAMUSCULAR; INTRAVENOUS; SUBCUTANEOUS EVERY 5 MIN PRN
Status: DISCONTINUED | OUTPATIENT
Start: 2024-04-04 | End: 2024-04-04 | Stop reason: HOSPADM

## 2024-04-04 RX ORDER — FAMOTIDINE 10 MG/ML
INJECTION, SOLUTION INTRAVENOUS PRN
Status: DISCONTINUED | OUTPATIENT
Start: 2024-04-04 | End: 2024-04-04 | Stop reason: SDUPTHER

## 2024-04-04 RX ORDER — LABETALOL HYDROCHLORIDE 5 MG/ML
10 INJECTION, SOLUTION INTRAVENOUS
Status: DISCONTINUED | OUTPATIENT
Start: 2024-04-04 | End: 2024-04-04 | Stop reason: HOSPADM

## 2024-04-04 RX ORDER — MEPERIDINE HYDROCHLORIDE 25 MG/ML
12.5 INJECTION INTRAMUSCULAR; INTRAVENOUS; SUBCUTANEOUS EVERY 5 MIN PRN
Status: DISCONTINUED | OUTPATIENT
Start: 2024-04-04 | End: 2024-04-04 | Stop reason: HOSPADM

## 2024-04-04 RX ORDER — SODIUM CHLORIDE 9 MG/ML
INJECTION, SOLUTION INTRAVENOUS CONTINUOUS
Status: DISCONTINUED | OUTPATIENT
Start: 2024-04-04 | End: 2024-04-04 | Stop reason: HOSPADM

## 2024-04-04 RX ORDER — PROPOFOL 10 MG/ML
INJECTION, EMULSION INTRAVENOUS PRN
Status: DISCONTINUED | OUTPATIENT
Start: 2024-04-04 | End: 2024-04-04 | Stop reason: SDUPTHER

## 2024-04-04 RX ORDER — DIPHENHYDRAMINE HYDROCHLORIDE 50 MG/ML
12.5 INJECTION INTRAMUSCULAR; INTRAVENOUS
Status: DISCONTINUED | OUTPATIENT
Start: 2024-04-04 | End: 2024-04-04 | Stop reason: HOSPADM

## 2024-04-04 RX ORDER — ONDANSETRON 2 MG/ML
INJECTION INTRAMUSCULAR; INTRAVENOUS PRN
Status: DISCONTINUED | OUTPATIENT
Start: 2024-04-04 | End: 2024-04-04 | Stop reason: SDUPTHER

## 2024-04-04 RX ORDER — HYDROMORPHONE HYDROCHLORIDE 2 MG/ML
INJECTION, SOLUTION INTRAMUSCULAR; INTRAVENOUS; SUBCUTANEOUS PRN
Status: DISCONTINUED | OUTPATIENT
Start: 2024-04-04 | End: 2024-04-04 | Stop reason: SDUPTHER

## 2024-04-04 RX ORDER — LORAZEPAM 2 MG/ML
0.5 INJECTION INTRAMUSCULAR
Status: DISCONTINUED | OUTPATIENT
Start: 2024-04-04 | End: 2024-04-04 | Stop reason: HOSPADM

## 2024-04-04 RX ORDER — ROCURONIUM BROMIDE 10 MG/ML
INJECTION, SOLUTION INTRAVENOUS PRN
Status: DISCONTINUED | OUTPATIENT
Start: 2024-04-04 | End: 2024-04-04 | Stop reason: SDUPTHER

## 2024-04-04 RX ORDER — IPRATROPIUM BROMIDE AND ALBUTEROL SULFATE 2.5; .5 MG/3ML; MG/3ML
1 SOLUTION RESPIRATORY (INHALATION)
Status: DISCONTINUED | OUTPATIENT
Start: 2024-04-04 | End: 2024-04-04 | Stop reason: HOSPADM

## 2024-04-04 RX ADMIN — SUGAMMADEX 200 MG: 100 INJECTION, SOLUTION INTRAVENOUS at 13:02

## 2024-04-04 RX ADMIN — ROCURONIUM BROMIDE 70 MG: 10 INJECTION, SOLUTION INTRAVENOUS at 12:03

## 2024-04-04 RX ADMIN — TRANEXAMIC ACID 1000 MG: 100 INJECTION, SOLUTION INTRAVENOUS at 12:12

## 2024-04-04 RX ADMIN — PHENYLEPHRINE HYDROCHLORIDE 100 MCG: 10 INJECTION INTRAVENOUS at 12:39

## 2024-04-04 RX ADMIN — HYDROMORPHONE HYDROCHLORIDE 2 MG: 2 INJECTION, SOLUTION INTRAMUSCULAR; INTRAVENOUS; SUBCUTANEOUS at 12:03

## 2024-04-04 RX ADMIN — SODIUM CHLORIDE, SODIUM LACTATE, POTASSIUM CHLORIDE, AND CALCIUM CHLORIDE: .6; .31; .03; .02 INJECTION, SOLUTION INTRAVENOUS at 11:35

## 2024-04-04 RX ADMIN — ONDANSETRON 8 MG: 2 INJECTION INTRAMUSCULAR; INTRAVENOUS at 12:00

## 2024-04-04 RX ADMIN — LIDOCAINE HYDROCHLORIDE 100 MG: 20 INJECTION, SOLUTION INFILTRATION; PERINEURAL at 12:03

## 2024-04-04 RX ADMIN — WATER 2000 MG: 1 INJECTION INTRAMUSCULAR; INTRAVENOUS; SUBCUTANEOUS at 12:10

## 2024-04-04 RX ADMIN — PROPOFOL 150 MG: 10 INJECTION, EMULSION INTRAVENOUS at 12:03

## 2024-04-04 RX ADMIN — FAMOTIDINE 20 MG: 10 INJECTION, SOLUTION INTRAVENOUS at 12:00

## 2024-04-04 RX ADMIN — PHENYLEPHRINE HYDROCHLORIDE 100 MCG: 10 INJECTION INTRAVENOUS at 12:11

## 2024-04-04 ASSESSMENT — PAIN SCALES - GENERAL
PAINLEVEL_OUTOF10: 0

## 2024-04-04 NOTE — ANESTHESIA PRE PROCEDURE
Department of Anesthesiology  Preprocedure Note       Name:  Kaylee Cortez   Age:  73 y.o.  :  1950                                          MRN:  [id number]         Date:  2024      Surgeon: Surgeon(s):  Jermaine Love MD    Procedure: Procedure(s):  EXCHANGE FOR PERMANENT IMPLANT LEFT BREAST; LEFT BREAST CAPSULECTOMIES  BILATERAL BREAST FAT GRAFTING  REVISION OF RIGHT BREAST RECONSTRUCTION    Medications prior to admission:   Prior to Admission medications    Medication Sig Start Date End Date Taking? Authorizing Provider   buPROPion (WELLBUTRIN XL) 300 MG extended release tablet Take 1 tablet by mouth every morning 23  Yes Milind Houston MD   rosuvastatin (CRESTOR) 20 MG tablet Take 1 tablet by mouth daily    Milind Houston MD   amLODIPine (NORVASC) 5 MG tablet Take 1 tablet by mouth nightly    Milind Houston MD   levothyroxine (SYNTHROID) 75 MCG tablet Take 1 tablet by mouth Daily    Milind Houston MD   ibuprofen (ADVIL;MOTRIN) 200 MG tablet Take 1 tablet by mouth every 6 hours as needed for Pain (PRN)  Patient not taking: Reported on 2024    Milind Houston MD   Alendronate Sodium (FOSAMAX PO) Take 70 mg by mouth once a week    Milind Houston MD   acetaminophen (TYLENOL) 500 MG tablet Take 2 tablets by mouth every 6 hours as needed for Pain    Milind Houston MD   anastrozole (ARIMIDEX) 1 MG tablet Take 1 tablet by mouth 21   Milnid Houston MD   Venlafaxine HCl (EFFEXOR XR PO) Take 187.5 mg by mouth daily    Milind Houston MD   atomoxetine (STRATTERA) 40 MG capsule Take 1 capsule by mouth daily    Milind Houston MD   Cholecalciferol (VITAMIN D3) 50 MCG (2000) CAPS Take by mouth daily    Milind Houston MD   calcium carbonate 600 MG TABS tablet Take 1 tablet by mouth daily    Milind Houston MD   famotidine (PEPCID) 20 MG tablet Take 1 tablet by mouth as needed    Milind Houston MD

## 2024-04-04 NOTE — BRIEF OP NOTE
Brief Postoperative Note      Patient: Kaylee Cortez  YOB: 1950  MRN: [id number]    Date of Procedure: 4/4/2024    Pre-Op Diagnosis Codes:     * Malignant neoplasm of left breast in female, estrogen receptor positive, unspecified site of breast (HCC) [C50.912, Z17.0]     * Status post breast reconstruction [Z98.890]    Post-Op Diagnosis: Same       Procedure(s):  EXCHANGE FOR PERMANENT IMPLANT LEFT BREAST; LEFT BREAST CAPSULECTOMIES  BILATERAL BREAST FAT GRAFTING  REVISION OF RIGHT BREAST RECONSTRUCTION    Surgeon(s):  Jermaine Love MD    Assistant:  Surgical Assistant: Radha Morrison  Resident: Naida Mcadams DO    Anesthesia: General    Estimated Blood Loss (mL): Minimal    Complications: None    Specimens:   ID Type Source Tests Collected by Time Destination   A : left breast expander for gross only Hardware Hardware SURGICAL PATHOLOGY Jermaine Love MD 4/4/2024 1222        Implants:  * No implants in log *      Drains: * No LDAs found *    Findings:  Infection Present At Time Of Surgery (PATOS) (choose all levels that have infection present):  No infection present  Other Findings: Uncomplicated exchange for permanent implant of L breast, bilateral breast fat grafting, revision of R breast reconstruction    Electronically signed by Naida Mcadams DO on 4/4/2024 at 1:06 PM

## 2024-04-04 NOTE — PROGRESS NOTES
Patient denies nausea or pain at this time. Patient states very drowsy and patient and  want to rest for a bit longer.

## 2024-04-04 NOTE — PROGRESS NOTES
Pt arrived form OR, s/p EXCHANGE FOR PERMANENT IMPLANT LEFT BREAST; LEFT BREAST CAPSULECTOMIES - Left , report received from CRNA and OR RN and surgical resident.  Pt very sedated on arrival, oral airway placed per CRNA Chris on arrival, sat improved to 96% .  Pt received 2 mg of dilaudid in OR.  BP stable

## 2024-04-04 NOTE — PROGRESS NOTES
Ambulatory Surgery/Procedure Discharge Note    Vitals:    04/04/24 1526   BP: 113/67   Pulse: 77   Resp: 16   Temp: 97 °F (36.1 °C)   SpO2: 97%     Patient meets criteria for discharge per jim score  In: 1227 [I.V.:1167]  Out: -     Restroom use offered before discharge.  Yes    Pain assessment:  none  Pain Level: 0    Pt and S.O./family states \"ready to go home\". Pt alert and oriented x4. IV removed. Denies N/V or pain. Dressing and binder C,D,I.  Voided prior to discharge. Pt tolerating po intake. Discharge instructions given to pt and , Sidney with pt permission. Pt and family verbalized understanding of all instructions. Left with all belongings, X2 prescriptions, and discharge instructions.     Patient discharged to home/self care. Patient discharged via wheel chair by transporter to waiting family/S.O.       4/4/2024 4:00 PM

## 2024-04-04 NOTE — H&P
Update History & Physical    The patient's History and Physical of March 6, 2024 was reviewed with the patient and I examined the patient. There was no change to her medical or surgical history. She does report some medication changes such as Crestor and Wellbutrin. The surgical site was confirmed by the patient and me.       Plan: The risks, benefits, expected outcome, and alternative to the recommended procedure have been discussed with the patient. Patient understands and wants to proceed with the procedure.     Electronically signed by Naida Mcadams DO on 4/4/2024 at 11:19 AM

## 2024-04-04 NOTE — PROGRESS NOTES
PACU Transfer to [facility]    Vitals:    04/04/24 1500   BP: 135/67   Pulse: 82   Resp: 14   Temp: 97.7 °F (36.5 °C)   SpO2: 95%         Intake/Output Summary (Last 24 hours) at 4/4/2024 1515  Last data filed at 4/4/2024 1509  Gross per 24 hour   Intake 1227 ml   Output --   Net 1227 ml       Pain assessment:    Pain Level: 0    Patient transferred to care of [facility] RN.    4/4/2024 3:15 PM

## 2024-04-04 NOTE — DISCHARGE INSTRUCTIONS
[facility]    Demetrius Love MD  [address] [facility] ([address])  [place] [address] [phone]    Post-op Instructions  ___________________________________________    Breast Reconstruction with Fat Grafting    The following instructions will help you know what to expect in the days following your surgery. Do not, however, hesitate to call if you have questions or concerns.    Activities    - Sleep in a flexed position with your head and shoulders elevated. Keep pillows under your knees for the first few days. You can resume your normal sleeping position when comfortable.   - Driving is prohibited for 1 to 2 weeks. Do not drive while taking pain medications.   - Avoid heavy lifting (no more than 5 pounds) and vigorous use of your arms for the first 3 weeks.   - Do not engage in sports, aerobics, or vacuuming.   - Start arm raising exercises gently within 1 week of surgery. This will be discussed at your follow-up appointment.         - Avoid heavy lifting >5 lb, bending, pushing, pulling, or straining   - Smoking (or ANY nicotine containing product) is prohibited for a minimum of 6 weeks as it interferes with healing and can lead to significant - and avoidable - complications  - You may need to use fiberfill or other padding on the opposite breast to maintain symmetry in clothing. Shoulder pads sometimes work nicely too.     Diet  - Resume your preoperative diet as tolerated.     Pain Control/Medications  - You will receive a prescription for pain medication that can be taken as directed if needed for pain control. The pain medication may cause constipation and does impair your ability to drive or make important decisions.          - You may also be given a muscle relaxant that can help with muscle spasms. Do                  not take the pain medication and muscle relaxant at the same time        - There is absolutely NO driving while on pain medication or Valium® or                possible complications, symptoms, or side effects of anesthesia.  Call physician if they or any other problems occur:  Signs of INFECTION   > Fever over 101°     > Redness, swelling, hardness or warmth at the operative site   >Foul smelling or cloudy drainage at the operative site   Unrelieved PAIN  Unrelieved NAUSEA  Blood soaked dressing.  (Some oozing may be normal)  Inability to urinate      Numb, pale, blue, cold or tingling extremity      Physician:  dr. Love    The above instructions were reviewed with patient/significant other.  The following additional patient specific information was reviewed with the patient/significant other:  [x]Procedure/physician specific instructions  []Medication information sheet(S) including potential side effects  []Sylvia’s egress test  []Pain Ball management  []FAQ Catheter associated blood stream infections  []FAQ Surgical Site Infections  []Other-    I have read and understand the instructions given to me: ____________________________________________   (Patient/S.O. Signature)            Date/time 4/4/2024 2:02 PM                 If you smoke STOP. We care about your health!

## 2024-04-04 NOTE — ANESTHESIA POSTPROCEDURE EVALUATION
Department of Anesthesiology  Postprocedure Note    Patient: Kaylee Cortez  MRN: [id number]  YOB: 1950  Date of evaluation: 4/4/2024    Procedure Summary       Date: 04/04/24 Room / Location: [address]    Anesthesia Start: 1158 Anesthesia Stop: 1313    Procedures:       EXCHANGE FOR PERMANENT IMPLANT LEFT BREAST; LEFT BREAST CAPSULECTOMIES (Left: Breast)      BILATERAL BREAST FAT GRAFTING (Bilateral: Breast)      REVISION OF RIGHT BREAST RECONSTRUCTION (Right: Breast) Diagnosis:       Malignant neoplasm of left breast in female, estrogen receptor positive, unspecified site of breast (HCC)      Status post breast reconstruction      (Malignant neoplasm of left breast in female, estrogen receptor positive, unspecified site of breast (HCC) [C50.912, Z17.0])      (Status post breast reconstruction [Z98.890])    Surgeons: Jermaine Love MD Responsible Provider: Huber Cervantes MD    Anesthesia Type: general ASA Status: 2            Anesthesia Type: No value filed.    Shahnaz Phase I: Shahnaz Score: 5    Shahnaz Phase II:      Anesthesia Post Evaluation    Patient location during evaluation: PACU  Patient participation: complete - patient participated  Level of consciousness: awake  Airway patency: patent  Nausea & Vomiting: no nausea and no vomiting  Cardiovascular status: blood pressure returned to baseline and hemodynamically stable  Respiratory status: acceptable  Hydration status: euvolemic  Multimodal analgesia pain management approach  Pain management: adequate    No notable events documented.

## 2024-04-04 NOTE — OP NOTE
[facility]     OPERATIVE DICTATION    NAME: Kaylee Cortez   MRN: [id number]  DATE: 4/4/2024      AGE: 73 y.o.    SURGEON: Jermaine Love MD  ASSISTANT: Naida Mcadams (PGY3), Radha Morrison (SA)     PREOPERATIVE DIAGNOSIS: Acquired absence left breast, status post tissue expander placement.  POSTOPERATIVE DIAGNOSIS: Same     OPERATION: 1) Exchange for permanent implant left breast    2) Revision of right breast reconstruction    3) Bilateral breast high volume fat grafting    ANESTHESIA: General     ESTIMATED BLOOD LOSS: <50 mL    OPERATIVE INDICATIONS: This is a 73 y.o. female who underwent mastectomy with tissue expander placement for breast cancer with details listed in a separate operative dictation. She previously underwent right lumpectomy with contour deformity and desired improvement of this as well. Options of reconstruction were discussed with the patient and she opted for an implant based reconstruction in her second stage. The plan of surgery, risks, benefits, alternatives, indications, limitations, possible complications, and future surgeries were discussed with the patient and she agreed to proceed.     OPERATIVE PROCEDURE: The patient was marked in the standing position in the preoperative holding area.  She was then brought to the operating room and placed in the supine position on the operating table. After satisfactory induction with general endotracheal anesthesia, the patient was then prepped and draped in the usual sterile fashion. The case began by infiltrating tumescent solution into the abdomen. Attention was then directed to the left breast. An inframammary incision was performed.  The tissue dissected down to the capsule, which was incised allowing entry to the pocket.  The expander was evacuated of saline and removed. The capsule was then evaluated and multiple rows of capsulotomies was performed on the lower aspect. After capsule work, multiple  sizers were utilized to find the best fit. Once the appropriate size was selected, attention was directed to the contralateral right breast. An incision was performed and multiple rigotomies were performed to break up as much scar as possible.    Attention was then directed back to the abdomen and liposuction was performed using a [employer] system. A total of 160 cc of lipoaspirate was utilized for fat grafting to the bilateral breasts. After the injection was completed, the sites were then closed using 5-0 Fast Gut. The patient was then sat back down and the pockets were prepared for implant.  The pocket was copiously irrigated with dilute Betadine followed by triple antibiotic solution.  The implant was brought to the field and then placed in triple antibiotic solution.  Using a no-touch technique with the Ang funnel, the implant was placed into the pocket.  The tissue was closed in layers using 3-0 Monocryl followed by a sterile dressing.     The patient was then awakened, extubated, and taken to the PACU in stable condition.  At the end of the case, all counts were correct and there were no immediate complications.  The patient tolerated the procedure well.    DRAINS: None    SPECIMEN: Expander    CONDITION: Stable    IMPLANTS:   (Left)   320 cc Penrose MemoryGel, Smooth, Round, Moderate Profile BOOST Breast Implants, reference# [id number], lot# [id number].    Jermaine Love MD

## 2024-04-11 ENCOUNTER — OFFICE VISIT (OUTPATIENT)
Dept: SURGERY | Age: 74
End: 2024-04-11

## 2024-04-11 VITALS
BODY MASS INDEX: 25.34 KG/M2 | OXYGEN SATURATION: 99 % | HEART RATE: 86 BPM | TEMPERATURE: 97.4 F | RESPIRATION RATE: 16 BRPM | HEIGHT: 63 IN | WEIGHT: 143 LBS | DIASTOLIC BLOOD PRESSURE: 83 MMHG | SYSTOLIC BLOOD PRESSURE: 125 MMHG

## 2024-04-11 DIAGNOSIS — Z09 POSTOP CHECK: Primary | ICD-10-CM

## 2024-04-11 PROCEDURE — 99024 POSTOP FOLLOW-UP VISIT: CPT

## 2024-04-11 NOTE — PROGRESS NOTES
[facility]    PROCEDURE: 1) Exchange for permanent implant left breast  2) Revision of right breast reconstruction  3) Bilateral breast high volume fat grafting  DATE: 4/4/24    Kaylee Cortez has been recovering well since her procedure. Pain has been well controlled without pain medications.     EXAM    Temp 97.4 °F (36.3 °C) (Temporal)   Resp 16   Ht 1.6 m (5' 3\")   Wt 64.9 kg (143 lb)   LMP  (LMP Unknown)   BMI 25.33 kg/m²       GEN: NAD   BREAST: Incision site healing appropriately. No hematoma/seroma. Good contour.   ABD: Incision site healing appropriately. No hematoma/seroma. Good contour.     PATHOLOGY: FINAL DIAGNOSIS:     Left breast tissue expander:   Foreign body, clinically tissue expander left breast ( gross only ).     IMP: 74 y.o.female s/p Exchange for permament implant left breast, revision of right breast reconstruction, bilateral breast fat grafting   PLAN: Doing well overall. Patient is happy thus far. Will follow up in 1 month with Dr. Love to ensure continued healing. Will call with any concerns in the interim.      Kailee Estrella, APRN - CNP   [facility]  [phone]  04/11/24

## 2024-05-13 ENCOUNTER — OFFICE VISIT (OUTPATIENT)
Dept: SURGERY | Age: 74
End: 2024-05-13

## 2024-05-13 VITALS
OXYGEN SATURATION: 97 % | WEIGHT: 142 LBS | SYSTOLIC BLOOD PRESSURE: 132 MMHG | HEART RATE: 84 BPM | TEMPERATURE: 97.6 F | DIASTOLIC BLOOD PRESSURE: 88 MMHG | BODY MASS INDEX: 25.15 KG/M2 | RESPIRATION RATE: 16 BRPM

## 2024-05-13 DIAGNOSIS — Z09 POSTOP CHECK: Primary | ICD-10-CM

## 2024-05-13 PROCEDURE — 99024 POSTOP FOLLOW-UP VISIT: CPT | Performed by: SURGERY

## 2024-05-13 NOTE — PROGRESS NOTES
[facility]    PROCEDURE: 1) Exchange for permanent implant left breast                          2) Revision of right breast reconstruction                          3) Bilateral breast high volume fat grafting  DATE: 4/4/24    Kaylee Cortez has been recovering well since her procedure. Pain has been well controlled without pain medications.     EXAM    /88 (Site: Right Upper Arm, Position: Sitting, Cuff Size: Medium Adult)   Pulse 84   Temp 97.6 °F (36.4 °C) (Infrared)   Resp 16   Wt 64.4 kg (142 lb)   LMP  (LMP Unknown)   SpO2 97%   BMI 25.15 kg/m²      GEN: NAD   BREAST: Incisions healing well  Good contour  Improved symmetry  ABD: Good contour    IMP: 74 y.o.female s/p IBR  PLAN: Doing well.  She is happy with her results. Will follow-up in 6 months.     Demetrius Love MD  [facility]  [phone]  05/13/24

## 2025-03-02 NOTE — PROGRESS NOTES
Assessment /Plan     For exam results, see Encounter Report.    1. Imbalance of muscle of eye     2. Binocular vision disorder with diplopia     3. S/P bilateral cataract extraction     4. Pseudophakia of both eyes     5. Astigmatism of both eyes, unspecified type                  S/p cataract surgery in both eyes, with bilateral posterior chamber intraocular lens.  Mr. Browning notes problem with blurred vision and horizontal diplopia with his new lenses.  Rechecked refraction.  Asitigmatic refractive error in each eye, but with reduced best-corrected VA in each eye, as compared to previous refraction.  OCT of retina at macula done today.  No evidence of abnormality.   Cover test indicates presence of eso deviation, but no vertical imbalance noted.    New  Spectacle lens Rx issued with power change in each lens, and with addition of horizontal prismatic correction in each lens to aid fusion.  Call/return if problems persist with new lenses        
satiety.  Genitourinary: Negative for difficulty urinating, dysuria, flank pain, frequency and hematuria.   Musculoskeletal: Negative for new gait problem, joint swelling and myalgias.   Skin: Negative for color change, pallor and rash.   Endocrine: negative for tremors, temperature intolerance or polydipsia.  Allergic/Immunologic: Negative for new environmental or food allergies.  Neurological: Negative for dizziness, seizures, speech difficulty, numbness.   Hematological: Negative for adenopathy.   Psychiatric/Behavioral: Negative for agitation and confusion.    EXAM    /78 (Site: Right Upper Arm)   Pulse 74   Temp 98 °F (36.7 °C) (Temporal)   Ht 1.6 m (5' 3\")   Wt 67.1 kg (148 lb)   LMP  (LMP Unknown)   BMI 26.22 kg/m²     GEN: NAD   BREAST: Incisions well healed  Good contour with expected areas of deficiency  ABD: Good contour    IMP: 74 y.o.female s/p IBR  PLAN: Doing well overall.  She is happy with her results. Advised that she can have Tylenol/Motrin for discomfort. Discussed signs to watch and she will follow-up in 1 year for surveillance. Will have her MRI at her 5 year evaluation.     Demetrius Love MD  [facility]  [phone]  03/03/25

## 2025-03-03 ENCOUNTER — OFFICE VISIT (OUTPATIENT)
Dept: SURGERY | Age: 75
End: 2025-03-03
Payer: MEDICARE

## 2025-03-03 VITALS
BODY MASS INDEX: 26.22 KG/M2 | TEMPERATURE: 98 F | DIASTOLIC BLOOD PRESSURE: 78 MMHG | HEIGHT: 63 IN | SYSTOLIC BLOOD PRESSURE: 118 MMHG | WEIGHT: 148 LBS | HEART RATE: 74 BPM

## 2025-03-03 DIAGNOSIS — Z09 POSTOP CHECK: Primary | ICD-10-CM

## 2025-03-03 PROCEDURE — 1123F ACP DISCUSS/DSCN MKR DOCD: CPT | Performed by: SURGERY

## 2025-03-03 PROCEDURE — G8419 CALC BMI OUT NRM PARAM NOF/U: HCPCS | Performed by: SURGERY

## 2025-03-03 PROCEDURE — G8427 DOCREV CUR MEDS BY ELIG CLIN: HCPCS | Performed by: SURGERY

## 2025-03-03 PROCEDURE — 1160F RVW MEDS BY RX/DR IN RCRD: CPT | Performed by: SURGERY

## 2025-03-03 PROCEDURE — 1036F TOBACCO NON-USER: CPT | Performed by: SURGERY

## 2025-03-03 PROCEDURE — 3017F COLORECTAL CA SCREEN DOC REV: CPT | Performed by: SURGERY

## 2025-03-03 PROCEDURE — 1159F MED LIST DOCD IN RCRD: CPT | Performed by: SURGERY

## 2025-03-03 PROCEDURE — 1090F PRES/ABSN URINE INCON ASSESS: CPT | Performed by: SURGERY

## 2025-03-03 PROCEDURE — G8400 PT W/DXA NO RESULTS DOC: HCPCS | Performed by: SURGERY

## 2025-03-03 PROCEDURE — 99213 OFFICE O/P EST LOW 20 MIN: CPT | Performed by: SURGERY

## (undated) DEVICE — RETRACTOR SURG WIDE FLAT LO PROF LTWT PHOTONGUIDE

## (undated) DEVICE — GLOVE,SURG,SENSICARE,ALOE,LF,PF,7: Brand: MEDLINE

## (undated) DEVICE — BRA SURG SUPP LG 38-40 IN ZIPPER

## (undated) DEVICE — Z DISCONTINUED USE 2272114 DRAPE SURG UTIL 26X15 IN W/ TAPE N INVASIVE MULTLYR DISP

## (undated) DEVICE — DRAIN,WOUND,15FR,3/16,FULL-FLUTED: Brand: MEDLINE

## (undated) DEVICE — PLASMABLADE X PS210-030S-LIGHT 3.0SL: Brand: PLASMABLADE™ X

## (undated) DEVICE — SPECIMEN ORIENTATION CHARMS, SIX DISTINCTLY SHAPED STERILE 10MM CHARMS: Brand: MARGINMAP

## (undated) DEVICE — SYSTEM IMPL DEL FOR BRST IMPL FUN EA = 5 UNITS

## (undated) DEVICE — PROVE COVER: Brand: UNBRANDED

## (undated) DEVICE — DRESSING GERM DIA1IN CNTR H DIA7MM BLU CHG ANTIMIC PROTCT

## (undated) DEVICE — APPLICATOR MEDICATED 26 CC SOLUTION HI LT ORNG CHLORAPREP

## (undated) DEVICE — SYSTEM SKIN CLOSURE 42CM DERMABOND PRINEO

## (undated) DEVICE — DRAIN SURG 15FR L3 16IN DIA47MM SIL RND HUBLESS FULL FLUT

## (undated) DEVICE — PROBE LOCALIZER W/DRAPE

## (undated) DEVICE — CAUTERY TIP POLISHER: Brand: DEVON

## (undated) DEVICE — GLOVE SURG SZ 75 L12IN FNGR THK79MIL GRN LTX FREE

## (undated) DEVICE — COVER,MAYO STAND,XL,STERILE: Brand: MEDLINE

## (undated) DEVICE — INTENDED USE FOR SURGICAL MARKING ON INTACT SKIN, ALSO PROVIDES A PERMANENT METHOD OF IDENTIFYING OBJECTS IN THE OPERATING ROOM: Brand: WRITESITE® PLUS MINI PREP RESISTANT MARKER

## (undated) DEVICE — BRA SURG SUPP MED 34-36 IN VELCRO STRP

## (undated) DEVICE — PADDING CAST W20XL29.8IN FOAM SELF ADH M SUPP LTWT RESTON

## (undated) DEVICE — STERILE PVP: Brand: MEDLINE INDUSTRIES, INC.

## (undated) DEVICE — SYRINGE 20ML LL S/C 50

## (undated) DEVICE — 3M™ TEGADERM™ CHG CHLORHEXIDINE GLUCONATE GEL PAD 1664, 25 EACH/CARTON, 4 CARTONS/CASE: Brand: 3M™ TEGADERM™

## (undated) DEVICE — BLANKET WRM W40.2XL55.9IN IORT LO BODY + MISTRAL AIR

## (undated) DEVICE — 3M™ IOBAN™ 2 ANTIMICROBIAL INCISE DRAPE 6648EZ: Brand: IOBAN™ 2

## (undated) DEVICE — YANKAUER,OPEN TIP,W/O VENT,STERILE: Brand: MEDLINE INDUSTRIES, INC.

## (undated) DEVICE — SUTURE PERMAHAND SZ 3-0 L18IN NONABSORBABLE BLK L26MM SH C013D

## (undated) DEVICE — 450 ML BOTTLE OF 0.05% CHLORHEXIDINE GLUCONATE IN 99.95% STERILE WATER FOR IRRIGATION, USP AND APPLICATOR.: Brand: IRRISEPT ANTIMICROBIAL WOUND LAVAGE

## (undated) DEVICE — SYSTEM FAT PROC ADV ADIPOSE REVOLVE 1 PER CA

## (undated) DEVICE — SYRINGE IRRIG 60ML SFT PLIABLE BLB EZ TO GRP 1 HND USE W/

## (undated) DEVICE — PAD FOAM 11.75X7-7/8 IN RESTON LF

## (undated) DEVICE — COUNTER NDL 40 COUNT HLD 70 NUM FOAM BLK SGL MAG W BLDE REMV

## (undated) DEVICE — PLASTIC MAJOR: Brand: MEDLINE INDUSTRIES, INC.

## (undated) DEVICE — GARMENT,MEDLINE,DVT,INT,CALF,MED, GEN2: Brand: MEDLINE

## (undated) DEVICE — 3M™ IOBAN™ 2 ANTIMICROBIAL INCISE DRAPE 6640EZ: Brand: IOBAN™ 2

## (undated) DEVICE — SYRINGE MED 10ML LUERLOCK TIP W/O SFTY DISP

## (undated) DEVICE — NEEDLE HYPO 22GA L1.5IN BLK POLYPR HUB S STL REG BVL STR

## (undated) DEVICE — GLOVE ORANGE PI 8 1/2   MSG9085

## (undated) DEVICE — SHEET,DRAPE,40X58,STERILE: Brand: MEDLINE

## (undated) DEVICE — GLOVE SURG SZ 7 L12IN FNGR THK79MIL GRN LTX FREE

## (undated) DEVICE — GOWN,SIRUS,POLYRNF,BRTHSLV,LG,30/CS: Brand: MEDLINE

## (undated) DEVICE — SUTURE PERMA-HAND SZ 2-0 L30IN NONABSORBABLE BLK L30MM FSL 679H

## (undated) DEVICE — TOWEL,STOP FLAG GOLD N-W: Brand: MEDLINE

## (undated) DEVICE — GAUZE FLUFF 1 PLY: Brand: DEROYAL

## (undated) DEVICE — CLEANER,CAUTERY TIP,2X2",STERILE: Brand: MEDLINE

## (undated) DEVICE — BINDER ABD UNIV H9IN WAIST 30-45IN E SFT COT PREM 3 PNL

## (undated) DEVICE — Device

## (undated) DEVICE — SHEET,DRAPE,53X77,STERILE: Brand: MEDLINE

## (undated) DEVICE — 1LYRTR 16FR10ML100%SIL UMS SNP: Brand: MEDLINE INDUSTRIES, INC.

## (undated) DEVICE — 15' LARGE BORE TUBING W/SILICONE INSERT FOR INFILTRATION PUMP SYSTEMS -SINGLE SPIKE,  BOX OF 10: Brand: INFILTRATION TUBING

## (undated) DEVICE — GAUZE,SPONGE,4"X4",16PLY,XRAY,STRL,LF: Brand: MEDLINE

## (undated) DEVICE — MINOR BREAST: Brand: MEDLINE INDUSTRIES, INC.

## (undated) DEVICE — SUTURE VCRL SZ 3-0 L27IN ABSRB UD L26MM SH 1/2 CIR J416H

## (undated) DEVICE — SURGIFOAM SPNG SZ 100

## (undated) DEVICE — SUTURE PERMA-HAND SZ 2-0 L30IN NONABSORBABLE BLK L26MM SH K833H

## (undated) DEVICE — 3M™ TEGADERM™ TRANSPARENT FILM DRESSING FRAME STYLE, 1624W, 2-3/8 IN X 2-3/4 IN (6 CM X 7 CM), 100/CT 4CT/CASE: Brand: 3M™ TEGADERM™

## (undated) DEVICE — SUTURE PDS II SZ 2-0 L27IN ABSRB UD FS-1 L24MM 3/8 CIR REV Z443H

## (undated) DEVICE — DRAPE,T,LAPARO,TRANS,STERILE: Brand: MEDLINE

## (undated) DEVICE — TEXTURED, HIGH PROFILE, SUTURE TABS, INTEGRAL INJECTION DOME, 500CC: Brand: ARTOURA BREAST TISSUE EXPANDER

## (undated) DEVICE — SUTURE MCRYL + SZ 4-0 L27IN ABSRB UD L19MM PS-2 3/8 CIR MCP426H

## (undated) DEVICE — GLOVE ORANGE PI 7 1/2   MSG9075

## (undated) DEVICE — 3M™ TEGADERM™ TRANSPARENT FILM DRESSING FRAME STYLE, 1626W, 4 IN X 4-3/4 IN (10 CM X 12 CM), 50/CT 4CT/CASE: Brand: 3M™ TEGADERM™

## (undated) DEVICE — SUTURE MCRYL SZ 4-0 L27IN ABSRB UD L19MM PS-2 1/2 CIR PRIM Y426H

## (undated) DEVICE — SPONGE GZ W4XL8IN COT WVN 12 PLY

## (undated) DEVICE — SYRINGE MED 30ML STD CLR PLAS LUERLOCK TIP N CTRL DISP

## (undated) DEVICE — 4MM SINGLE USE CANNULA, 10MM PORT, 22CM LONG, TRI-PORT II: Brand: MICROAIRE®

## (undated) DEVICE — SOLUTION IV 1000ML 0.9% SOD CHL

## (undated) DEVICE — KIT PROC 1 ICG VI AQ SOLVNT DRP SPY ELITE

## (undated) DEVICE — INTENDED FOR TISSUE SEPARATION, AND OTHER PROCEDURES THAT REQUIRE A SHARP SURGICAL BLADE TO PUNCTURE OR CUT.: Brand: BARD-PARKER ® CARBON RIB-BACK BLADES

## (undated) DEVICE — SUTURE PLN GUT SZ 5-0 L18IN ABSRB YELLOWISH TAN L13MM PC-1 1915G

## (undated) DEVICE — ASPIRATION TUBING SET, DISPOSABLE: Brand: MICROAIRE®

## (undated) DEVICE — CLIP LIG M BLU TI HRT SHP WIRE HORZ 600 PER BX

## (undated) DEVICE — PORT INSERTION: Brand: MEDLINE INDUSTRIES, INC.

## (undated) DEVICE — SUTURE MCRYL SZ 3-0 L27IN ABSRB UD L19MM PS-2 3/8 CIR PRIM Y427H

## (undated) DEVICE — SYSTEM SKIN CLSR 22CM DERMBND PRINEO

## (undated) DEVICE — RESERVOIR,SUCTION,100CC,SILICONE: Brand: MEDLINE

## (undated) DEVICE — DRAPE,CHEST,FENES,15X10,STERIL: Brand: MEDLINE

## (undated) DEVICE — BLADE,CARBON-STEEL,15,STRL,DISPOSABLE,TB: Brand: MEDLINE

## (undated) DEVICE — SUTURE PDS II SZ 2-0 L27IN ABSRB VLT L26MM CT-2 1/2 CIR Z333H

## (undated) DEVICE — 6 ML SYRINGE LUER-LOCK TIP: Brand: MONOJECT

## (undated) DEVICE — SPONGE,LAP,18"X18",DLX,XR,ST,5/PK,40/PK: Brand: MEDLINE

## (undated) DEVICE — SYRINGE CATH TIP 50ML

## (undated) DEVICE — STRAP SFTY W5XL72IN 1 PC